# Patient Record
Sex: MALE
[De-identification: names, ages, dates, MRNs, and addresses within clinical notes are randomized per-mention and may not be internally consistent; named-entity substitution may affect disease eponyms.]

---

## 2018-11-05 ENCOUNTER — APPOINTMENT (OUTPATIENT)
Dept: ORTHOPEDIC SURGERY | Facility: CLINIC | Age: 53
End: 2018-11-05
Payer: MEDICAID

## 2018-11-05 VITALS
HEART RATE: 81 BPM | SYSTOLIC BLOOD PRESSURE: 123 MMHG | WEIGHT: 255 LBS | DIASTOLIC BLOOD PRESSURE: 80 MMHG | BODY MASS INDEX: 35.7 KG/M2 | HEIGHT: 71 IN

## 2018-11-05 PROCEDURE — 73564 X-RAY EXAM KNEE 4 OR MORE: CPT | Mod: RT

## 2018-11-05 PROCEDURE — 99204 OFFICE O/P NEW MOD 45 MIN: CPT | Mod: 25

## 2018-11-05 PROCEDURE — 73502 X-RAY EXAM HIP UNI 2-3 VIEWS: CPT | Mod: LT

## 2018-11-05 PROCEDURE — 20610 DRAIN/INJ JOINT/BURSA W/O US: CPT | Mod: RT

## 2019-01-22 ENCOUNTER — APPOINTMENT (OUTPATIENT)
Dept: ORTHOPEDIC SURGERY | Facility: CLINIC | Age: 54
End: 2019-01-22
Payer: MEDICAID

## 2019-01-22 VITALS — WEIGHT: 262 LBS | HEIGHT: 71 IN | BODY MASS INDEX: 36.68 KG/M2

## 2019-01-22 DIAGNOSIS — F17.200 NICOTINE DEPENDENCE, UNSPECIFIED, UNCOMPLICATED: ICD-10-CM

## 2019-01-22 DIAGNOSIS — Z82.61 FAMILY HISTORY OF ARTHRITIS: ICD-10-CM

## 2019-01-22 DIAGNOSIS — Z78.9 OTHER SPECIFIED HEALTH STATUS: ICD-10-CM

## 2019-01-22 DIAGNOSIS — Z80.1 FAMILY HISTORY OF MALIGNANT NEOPLASM OF TRACHEA, BRONCHUS AND LUNG: ICD-10-CM

## 2019-01-22 DIAGNOSIS — F19.90 OTHER PSYCHOACTIVE SUBSTANCE USE, UNSPECIFIED, UNCOMPLICATED: ICD-10-CM

## 2019-01-22 DIAGNOSIS — Z80.52 FAMILY HISTORY OF MALIGNANT NEOPLASM OF BLADDER: ICD-10-CM

## 2019-01-22 PROCEDURE — 99214 OFFICE O/P EST MOD 30 MIN: CPT

## 2019-01-22 NOTE — DISCUSSION/SUMMARY
[de-identified] : This patient has severe left hip osteoarthritis. He has failed a course of conservative management and would like to proceed with a direct anterior approach left total hip arthroplasty. He would like to consider same-day discharge if he clears physical therapy.\par \par The patient is an appropriate candidate for consideration of left total hip replacement. An extensive discussion was conducted of the natural history of the disease and the variety of surgical and non-surgical treatment options available to the patient. A risk/benefit analysis was discussed with the patient reviewing the advantages and disadvantages of surgical intervention at this time. Both the level and length of the patient's pain have made additional conservative treatment measures consisting of physical therapy, and/or corticosteroids contraindicated. A full explanation was given of the nature and the purpose of the procedure and anesthesia, its benefits, possible alternative methods of diagnosis or treatment, the risks involved, the possibility of complications, the foreseeable consequences of the procedure and the possible results of the non-treatment.\par No guarantee or assurance was made as to the results that may be obtained. Specifically, the risks were identified to include, but are not limited to the following: Infection, phlebitis, pulmonary embolism, death, paralysis, dislocation, pain, stiffness, instability, limp, weakness, breakage, leg-length inequality, uncontrolled bleeding, nerve injury, blood vessel injury, pressure sores, anesthetic risks, delayed healing of wound and bone, and wear and loosening. Further discussion was undertaken with the patient about the details of surgical preparation, treatment, and postoperative rehabilitation including medical clearance, autotransfusion, the hospital course, and the postoperative rehabilitation involved. All in all, I feel that this patient is a good candidate for surgical reconstruction.\par \par This patient is a smoker and understands our nicotine cessation policy and will be required to stop smoke 1 month before surgery and continued 1 month minimum after surgery. The patient may  be tested for nicotine in addition prior to undergoing joint arthroplasty.\par

## 2019-01-22 NOTE — HISTORY OF PRESENT ILLNESS
[de-identified] : This is very nice 53-year-old gentleman experiencing left hip and groin and thigh, which is severe in intensity.I have previously diagnosed him with left hip osteoarthritis. His knee pain is significantly improved with the cortisone injection.The hip pain substantially limits activities of daily living. Walking tolerance is reduced.  He has difficulty putting on shoes and socks.  He has difficulty walking up and down stairs.  He does have pain at night.  He works as a  and stands the majority of the day which is making the pain worse.  He does not use a cane or walker.  He has tried physical therapy for this now which made the pain worse.  He has been taking mobic which is not helping to improve the pain. The pain does not travel down his legs and it is not associated with numbness or tingling or weakness.

## 2019-01-22 NOTE — PHYSICAL EXAM
[de-identified] : Patient is well nourished, well-developed, in no acute distress, with appropriate mood and affect. The patient is oriented to time, place, and person. Respirations are even and unlabored. Gait evaluation reveals a limp. There is no inguinal adenopathy. Examination of the contralateral hip shows normal range of motion, strength, no tenderness, and intact skin. The affected limb is well-perfused, shows a grossly normal motor and sensory examination. Examination of the hip shows no skin lesions. Hip motion is reduced and causes pain. Leg lengths are approximately equal. Stinchfield test is positive. Both hips are stable and muscle strength is normal. Pedal pulses are palpable.\par  [de-identified] : AP and lateral x-rays of the left hip, pelvis, and femur were reviewed from prior visit and demonstrate severe degenerative joint disease of the hip with joint space narrowing, osteophyte formation, and subchondral sclerosis.

## 2019-01-28 ENCOUNTER — OUTPATIENT (OUTPATIENT)
Dept: OUTPATIENT SERVICES | Facility: HOSPITAL | Age: 54
LOS: 1 days | End: 2019-01-28
Payer: MEDICAID

## 2019-01-28 VITALS
HEIGHT: 71 IN | OXYGEN SATURATION: 97 % | SYSTOLIC BLOOD PRESSURE: 126 MMHG | RESPIRATION RATE: 16 BRPM | TEMPERATURE: 98 F | HEART RATE: 77 BPM | WEIGHT: 261.91 LBS | DIASTOLIC BLOOD PRESSURE: 88 MMHG

## 2019-01-28 DIAGNOSIS — Z29.9 ENCOUNTER FOR PROPHYLACTIC MEASURES, UNSPECIFIED: ICD-10-CM

## 2019-01-28 DIAGNOSIS — M16.12 UNILATERAL PRIMARY OSTEOARTHRITIS, LEFT HIP: ICD-10-CM

## 2019-01-28 DIAGNOSIS — Z98.890 OTHER SPECIFIED POSTPROCEDURAL STATES: Chronic | ICD-10-CM

## 2019-01-28 DIAGNOSIS — G47.33 OBSTRUCTIVE SLEEP APNEA (ADULT) (PEDIATRIC): ICD-10-CM

## 2019-01-28 DIAGNOSIS — Z01.818 ENCOUNTER FOR OTHER PREPROCEDURAL EXAMINATION: ICD-10-CM

## 2019-01-28 DIAGNOSIS — M16.9 OSTEOARTHRITIS OF HIP, UNSPECIFIED: ICD-10-CM

## 2019-01-28 LAB
ANION GAP SERPL CALC-SCNC: 13 MMOL/L — SIGNIFICANT CHANGE UP (ref 5–17)
BLD GP AB SCN SERPL QL: NEGATIVE — SIGNIFICANT CHANGE UP
BUN SERPL-MCNC: 19 MG/DL — SIGNIFICANT CHANGE UP (ref 7–23)
CALCIUM SERPL-MCNC: 9.1 MG/DL — SIGNIFICANT CHANGE UP (ref 8.4–10.5)
CHLORIDE SERPL-SCNC: 104 MMOL/L — SIGNIFICANT CHANGE UP (ref 96–108)
CO2 SERPL-SCNC: 24 MMOL/L — SIGNIFICANT CHANGE UP (ref 22–31)
CREAT SERPL-MCNC: 0.76 MG/DL — SIGNIFICANT CHANGE UP (ref 0.5–1.3)
GLUCOSE SERPL-MCNC: 88 MG/DL — SIGNIFICANT CHANGE UP (ref 70–99)
HBA1C BLD-MCNC: 5.5 % — SIGNIFICANT CHANGE UP (ref 4–5.6)
HCT VFR BLD CALC: 42.7 % — SIGNIFICANT CHANGE UP (ref 39–50)
HGB BLD-MCNC: 14.1 G/DL — SIGNIFICANT CHANGE UP (ref 13–17)
MCHC RBC-ENTMCNC: 31.5 PG — SIGNIFICANT CHANGE UP (ref 27–34)
MCHC RBC-ENTMCNC: 33 GM/DL — SIGNIFICANT CHANGE UP (ref 32–36)
MCV RBC AUTO: 95.5 FL — SIGNIFICANT CHANGE UP (ref 80–100)
PLATELET # BLD AUTO: 219 K/UL — SIGNIFICANT CHANGE UP (ref 150–400)
POTASSIUM SERPL-MCNC: 4.3 MMOL/L — SIGNIFICANT CHANGE UP (ref 3.5–5.3)
POTASSIUM SERPL-SCNC: 4.3 MMOL/L — SIGNIFICANT CHANGE UP (ref 3.5–5.3)
RBC # BLD: 4.47 M/UL — SIGNIFICANT CHANGE UP (ref 4.2–5.8)
RBC # FLD: 13.3 % — SIGNIFICANT CHANGE UP (ref 10.3–14.5)
RH IG SCN BLD-IMP: NEGATIVE — SIGNIFICANT CHANGE UP
SODIUM SERPL-SCNC: 141 MMOL/L — SIGNIFICANT CHANGE UP (ref 135–145)
WBC # BLD: 6.71 K/UL — SIGNIFICANT CHANGE UP (ref 3.8–10.5)
WBC # FLD AUTO: 6.71 K/UL — SIGNIFICANT CHANGE UP (ref 3.8–10.5)

## 2019-01-28 PROCEDURE — G0463: CPT

## 2019-01-28 PROCEDURE — 80048 BASIC METABOLIC PNL TOTAL CA: CPT

## 2019-01-28 PROCEDURE — 87640 STAPH A DNA AMP PROBE: CPT

## 2019-01-28 PROCEDURE — 83036 HEMOGLOBIN GLYCOSYLATED A1C: CPT

## 2019-01-28 PROCEDURE — 86850 RBC ANTIBODY SCREEN: CPT

## 2019-01-28 PROCEDURE — 85027 COMPLETE CBC AUTOMATED: CPT

## 2019-01-28 PROCEDURE — 86901 BLOOD TYPING SEROLOGIC RH(D): CPT

## 2019-01-28 PROCEDURE — 86900 BLOOD TYPING SEROLOGIC ABO: CPT

## 2019-01-28 PROCEDURE — 87641 MR-STAPH DNA AMP PROBE: CPT

## 2019-01-28 RX ORDER — LIDOCAINE HCL 20 MG/ML
0.2 VIAL (ML) INJECTION ONCE
Qty: 0 | Refills: 0 | Status: DISCONTINUED | OUTPATIENT
Start: 2019-02-08 | End: 2019-02-08

## 2019-01-28 RX ORDER — BUPROPION HYDROCHLORIDE 150 MG/1
1 TABLET, EXTENDED RELEASE ORAL
Qty: 0 | Refills: 0 | COMMUNITY

## 2019-01-28 RX ORDER — ACETAMINOPHEN 500 MG
975 TABLET ORAL ONCE
Qty: 0 | Refills: 0 | Status: DISCONTINUED | OUTPATIENT
Start: 2019-02-08 | End: 2019-02-10

## 2019-01-28 RX ORDER — CEFAZOLIN SODIUM 1 G
2000 VIAL (EA) INJECTION ONCE
Qty: 0 | Refills: 0 | Status: COMPLETED | OUTPATIENT
Start: 2019-02-08 | End: 2019-02-08

## 2019-01-28 RX ORDER — SODIUM CHLORIDE 9 MG/ML
3 INJECTION INTRAMUSCULAR; INTRAVENOUS; SUBCUTANEOUS EVERY 8 HOURS
Qty: 0 | Refills: 0 | Status: DISCONTINUED | OUTPATIENT
Start: 2019-02-08 | End: 2019-02-08

## 2019-01-28 RX ORDER — GABAPENTIN 400 MG/1
300 CAPSULE ORAL ONCE
Qty: 0 | Refills: 0 | Status: COMPLETED | OUTPATIENT
Start: 2019-02-08 | End: 2019-02-08

## 2019-01-28 RX ORDER — TRAMADOL HYDROCHLORIDE 50 MG/1
50 TABLET ORAL ONCE
Qty: 0 | Refills: 0 | Status: DISCONTINUED | OUTPATIENT
Start: 2019-02-08 | End: 2019-02-08

## 2019-01-28 RX ORDER — PANTOPRAZOLE SODIUM 20 MG/1
40 TABLET, DELAYED RELEASE ORAL ONCE
Qty: 0 | Refills: 0 | Status: COMPLETED | OUTPATIENT
Start: 2019-02-08 | End: 2019-02-08

## 2019-01-28 RX ORDER — MELOXICAM 15 MG/1
1 TABLET ORAL
Qty: 0 | Refills: 0 | COMMUNITY

## 2019-01-28 NOTE — H&P PST ADULT - PMH
OA (osteoarthritis) of hip  left  OA (osteoarthritis) of knee  right  Smoker  Quit 1/20/19 - taking wellbutrin

## 2019-01-28 NOTE — H&P PST ADULT - ASSESSMENT
CAPRINI SCORE [CLOT]    AGE RELATED RISK FACTORS                                                       MOBILITY RELATED FACTORS  [ x] Age 41-60 years                                            (1 Point)                  [ ] Bed rest                                                        (1 Point)  [ ] Age: 61-74 years                                           (2 Points)                 [ ] Plaster cast                                                   (2 Points)  [ ] Age= 75 years                                              (3 Points)                 [ ] Bed bound for more than 72 hours                 (2 Points)    DISEASE RELATED RISK FACTORS                                               GENDER SPECIFIC FACTORS  [ ] Edema in the lower extremities                       (1 Point)                  [ ] Pregnancy                                                     (1 Point)  [ ] Varicose veins                                               (1 Point)                  [ ] Post-partum < 6 weeks                                   (1 Point)             [x ] BMI > 25 Kg/m2                                            (1 Point)                  [ ] Hormonal therapy  or oral contraception          (1 Point)                 [ ] Sepsis (in the previous month)                        (1 Point)                  [ ] History of pregnancy complications                 (1 point)  [ ] Pneumonia or serious lung disease                                               [ ] Unexplained or recurrent                     (1 Point)           (in the previous month)                               (1 Point)  [ ] Abnormal pulmonary function test                     (1 Point)                 SURGERY RELATED RISK FACTORS  [ ] Acute myocardial infarction                              (1 Point)                 [ ]  Section                                             (1 Point)  [ ] Congestive heart failure (in the previous month)  (1 Point)               [ ] Minor surgery                                                  (1 Point)   [ ] Inflammatory bowel disease                             (1 Point)                 [ ] Arthroscopic surgery                                        (2 Points)  [ ] Central venous access                                      (2 Points)                [ ] General surgery lasting more than 45 minutes   (2 Points)       [ ] Stroke (in the previous month)                          (5 Points)               [ x] Elective arthroplasty                                         (5 Points)                                                                                                                                               HEMATOLOGY RELATED FACTORS                                                 TRAUMA RELATED RISK FACTORS  [ ] Prior episodes of VTE                                     (3 Points)                 [ ] Fracture of the hip, pelvis, or leg                       (5 Points)  [ ] Positive family history for VTE                         (3 Points)                 [ ] Acute spinal cord injury (in the previous month)  (5 Points)  [ ] Prothrombin 11868 A                                     (3 Points)                 [ ] Paralysis  (less than 1 month)                             (5 Points)  [ ] Factor V Leiden                                             (3 Points)                  [ ] Multiple Trauma within 1 month                        (5 Points)  [ ] Lupus anticoagulants                                     (3 Points)                                                           [ ] Anticardiolipin antibodies                               (3 Points)                                                       [ ] High homocysteine in the blood                      (3 Points)                                             [ ] Other congenital or acquired thrombophilia      (3 Points)                                                [ ] Heparin induced thrombocytopenia                  (3 Points)                                          Total Score [       7   ]

## 2019-01-28 NOTE — H&P PST ADULT - NSANTHOSAYNRD_GEN_A_CORE
No. ODALYS screening performed.  STOP BANG Legend: 0-2 = LOW Risk; 3-4 = INTERMEDIATE Risk; 5-8 = HIGH Risk

## 2019-01-28 NOTE — H&P PST ADULT - PROBLEM SELECTOR PLAN 1
Left Anterior Total Hip Replacement  Pre-op education, including Chlorhexidine soap, provided - all questions answered

## 2019-01-28 NOTE — H&P PST ADULT - HISTORY OF PRESENT ILLNESS
52 yo male, PMH OA of left hip and right knee for over 2 years, has been managed with NSAIDs but in the past 3 months pain on left  hip has become worse without any relief. Pt. presents to PST today for scheduled Left Anterior Total Hip Replacement on 2/8/19. Denies recent fever, chills, chest pain, SOB, changes in bowel/urinary habits.

## 2019-01-29 LAB
MRSA PCR RESULT.: SIGNIFICANT CHANGE UP
S AUREUS DNA NOSE QL NAA+PROBE: DETECTED

## 2019-01-31 ENCOUNTER — OTHER (OUTPATIENT)
Age: 54
End: 2019-01-31

## 2019-02-06 PROBLEM — M25.552 LEFT HIP PAIN: Status: RESOLVED | Noted: 2018-11-04 | Resolved: 2019-02-06

## 2019-02-06 PROBLEM — M16.12 ARTHRITIS OF LEFT HIP: Status: RESOLVED | Noted: 2018-11-05 | Resolved: 2019-02-06

## 2019-02-07 ENCOUNTER — TRANSCRIPTION ENCOUNTER (OUTPATIENT)
Age: 54
End: 2019-02-07

## 2019-02-08 ENCOUNTER — APPOINTMENT (OUTPATIENT)
Dept: ORTHOPEDIC SURGERY | Facility: HOSPITAL | Age: 54
End: 2019-02-08

## 2019-02-08 ENCOUNTER — INPATIENT (INPATIENT)
Facility: HOSPITAL | Age: 54
LOS: 1 days | Discharge: HOME | DRG: 470 | End: 2019-02-10
Attending: ORTHOPAEDIC SURGERY | Admitting: ORTHOPAEDIC SURGERY
Payer: MEDICAID

## 2019-02-08 VITALS
TEMPERATURE: 98 F | HEIGHT: 71 IN | DIASTOLIC BLOOD PRESSURE: 76 MMHG | HEART RATE: 76 BPM | WEIGHT: 261.91 LBS | RESPIRATION RATE: 18 BRPM | SYSTOLIC BLOOD PRESSURE: 116 MMHG | OXYGEN SATURATION: 95 %

## 2019-02-08 DIAGNOSIS — M16.12 UNILATERAL PRIMARY OSTEOARTHRITIS, LEFT HIP: ICD-10-CM

## 2019-02-08 DIAGNOSIS — M25.552 PAIN IN LEFT HIP: ICD-10-CM

## 2019-02-08 DIAGNOSIS — Z98.890 OTHER SPECIFIED POSTPROCEDURAL STATES: Chronic | ICD-10-CM

## 2019-02-08 LAB — RH IG SCN BLD-IMP: NEGATIVE — SIGNIFICANT CHANGE UP

## 2019-02-08 PROCEDURE — 72170 X-RAY EXAM OF PELVIS: CPT | Mod: 26

## 2019-02-08 PROCEDURE — 27130 TOTAL HIP ARTHROPLASTY: CPT | Mod: LT

## 2019-02-08 RX ORDER — ONDANSETRON 8 MG/1
4 TABLET, FILM COATED ORAL EVERY 6 HOURS
Qty: 0 | Refills: 0 | Status: DISCONTINUED | OUTPATIENT
Start: 2019-02-08 | End: 2019-02-10

## 2019-02-08 RX ORDER — ASPIRIN/CALCIUM CARB/MAGNESIUM 324 MG
81 TABLET ORAL EVERY 12 HOURS
Qty: 0 | Refills: 0 | Status: DISCONTINUED | OUTPATIENT
Start: 2019-02-08 | End: 2019-02-08

## 2019-02-08 RX ORDER — ACETAMINOPHEN 500 MG
1000 TABLET ORAL ONCE
Qty: 0 | Refills: 0 | Status: COMPLETED | OUTPATIENT
Start: 2019-02-09 | End: 2019-02-09

## 2019-02-08 RX ORDER — ACETAMINOPHEN 500 MG
1000 TABLET ORAL ONCE
Qty: 0 | Refills: 0 | Status: COMPLETED | OUTPATIENT
Start: 2019-02-08 | End: 2019-02-08

## 2019-02-08 RX ORDER — INFLUENZA VIRUS VACCINE 15; 15; 15; 15 UG/.5ML; UG/.5ML; UG/.5ML; UG/.5ML
0.5 SUSPENSION INTRAMUSCULAR ONCE
Qty: 0 | Refills: 0 | Status: DISCONTINUED | OUTPATIENT
Start: 2019-02-08 | End: 2019-02-10

## 2019-02-08 RX ORDER — SODIUM CHLORIDE 9 MG/ML
1000 INJECTION INTRAMUSCULAR; INTRAVENOUS; SUBCUTANEOUS
Qty: 0 | Refills: 0 | Status: DISCONTINUED | OUTPATIENT
Start: 2019-02-08 | End: 2019-02-10

## 2019-02-08 RX ORDER — HYDROMORPHONE HYDROCHLORIDE 2 MG/ML
0.5 INJECTION INTRAMUSCULAR; INTRAVENOUS; SUBCUTANEOUS
Qty: 0 | Refills: 0 | Status: DISCONTINUED | OUTPATIENT
Start: 2019-02-08 | End: 2019-02-08

## 2019-02-08 RX ORDER — PANTOPRAZOLE SODIUM 20 MG/1
40 TABLET, DELAYED RELEASE ORAL
Qty: 0 | Refills: 0 | Status: DISCONTINUED | OUTPATIENT
Start: 2019-02-08 | End: 2019-02-10

## 2019-02-08 RX ORDER — POLYETHYLENE GLYCOL 3350 17 G/17G
17 POWDER, FOR SOLUTION ORAL DAILY
Qty: 0 | Refills: 0 | Status: DISCONTINUED | OUTPATIENT
Start: 2019-02-08 | End: 2019-02-10

## 2019-02-08 RX ORDER — ASPIRIN/CALCIUM CARB/MAGNESIUM 324 MG
325 TABLET ORAL
Qty: 0 | Refills: 0 | Status: DISCONTINUED | OUTPATIENT
Start: 2019-02-08 | End: 2019-02-08

## 2019-02-08 RX ORDER — MAGNESIUM HYDROXIDE 400 MG/1
30 TABLET, CHEWABLE ORAL DAILY
Qty: 0 | Refills: 0 | Status: DISCONTINUED | OUTPATIENT
Start: 2019-02-08 | End: 2019-02-10

## 2019-02-08 RX ORDER — SENNA PLUS 8.6 MG/1
2 TABLET ORAL AT BEDTIME
Qty: 0 | Refills: 0 | Status: DISCONTINUED | OUTPATIENT
Start: 2019-02-08 | End: 2019-02-10

## 2019-02-08 RX ORDER — DOCUSATE SODIUM 100 MG
100 CAPSULE ORAL THREE TIMES A DAY
Qty: 0 | Refills: 0 | Status: DISCONTINUED | OUTPATIENT
Start: 2019-02-08 | End: 2019-02-10

## 2019-02-08 RX ORDER — KETOROLAC TROMETHAMINE 30 MG/ML
30 SYRINGE (ML) INJECTION EVERY 8 HOURS
Qty: 0 | Refills: 0 | Status: DISCONTINUED | OUTPATIENT
Start: 2019-02-08 | End: 2019-02-10

## 2019-02-08 RX ORDER — OXYCODONE HYDROCHLORIDE 5 MG/1
10 TABLET ORAL EVERY 4 HOURS
Qty: 0 | Refills: 0 | Status: DISCONTINUED | OUTPATIENT
Start: 2019-02-08 | End: 2019-02-10

## 2019-02-08 RX ORDER — CELECOXIB 200 MG/1
200 CAPSULE ORAL EVERY 12 HOURS
Qty: 0 | Refills: 0 | Status: DISCONTINUED | OUTPATIENT
Start: 2019-02-10 | End: 2019-02-10

## 2019-02-08 RX ORDER — TRAMADOL HYDROCHLORIDE 50 MG/1
50 TABLET ORAL EVERY 8 HOURS
Qty: 0 | Refills: 0 | Status: DISCONTINUED | OUTPATIENT
Start: 2019-02-08 | End: 2019-02-10

## 2019-02-08 RX ORDER — OXYCODONE HYDROCHLORIDE 5 MG/1
5 TABLET ORAL EVERY 4 HOURS
Qty: 0 | Refills: 0 | Status: DISCONTINUED | OUTPATIENT
Start: 2019-02-08 | End: 2019-02-10

## 2019-02-08 RX ORDER — ASCORBIC ACID 60 MG
500 TABLET,CHEWABLE ORAL
Qty: 0 | Refills: 0 | Status: DISCONTINUED | OUTPATIENT
Start: 2019-02-08 | End: 2019-02-10

## 2019-02-08 RX ORDER — ACETAMINOPHEN 500 MG
975 TABLET ORAL EVERY 8 HOURS
Qty: 0 | Refills: 0 | Status: DISCONTINUED | OUTPATIENT
Start: 2019-02-09 | End: 2019-02-10

## 2019-02-08 RX ORDER — FOLIC ACID 0.8 MG
1 TABLET ORAL DAILY
Qty: 0 | Refills: 0 | Status: DISCONTINUED | OUTPATIENT
Start: 2019-02-08 | End: 2019-02-10

## 2019-02-08 RX ORDER — DIAZEPAM 5 MG
2 TABLET ORAL EVERY 8 HOURS
Qty: 0 | Refills: 0 | Status: DISCONTINUED | OUTPATIENT
Start: 2019-02-08 | End: 2019-02-10

## 2019-02-08 RX ORDER — SODIUM CHLORIDE 9 MG/ML
500 INJECTION INTRAMUSCULAR; INTRAVENOUS; SUBCUTANEOUS ONCE
Qty: 0 | Refills: 0 | Status: COMPLETED | OUTPATIENT
Start: 2019-02-08 | End: 2019-02-08

## 2019-02-08 RX ORDER — SODIUM CHLORIDE 9 MG/ML
500 INJECTION INTRAMUSCULAR; INTRAVENOUS; SUBCUTANEOUS ONCE
Qty: 0 | Refills: 0 | Status: COMPLETED | OUTPATIENT
Start: 2019-02-09 | End: 2019-02-09

## 2019-02-08 RX ORDER — ONDANSETRON 8 MG/1
4 TABLET, FILM COATED ORAL ONCE
Qty: 0 | Refills: 0 | Status: DISCONTINUED | OUTPATIENT
Start: 2019-02-08 | End: 2019-02-08

## 2019-02-08 RX ORDER — ASPIRIN/CALCIUM CARB/MAGNESIUM 324 MG
325 TABLET ORAL
Qty: 0 | Refills: 0 | Status: DISCONTINUED | OUTPATIENT
Start: 2019-02-08 | End: 2019-02-10

## 2019-02-08 RX ORDER — CEFAZOLIN SODIUM 1 G
3000 VIAL (EA) INJECTION EVERY 8 HOURS
Qty: 0 | Refills: 0 | Status: COMPLETED | OUTPATIENT
Start: 2019-02-08 | End: 2019-02-09

## 2019-02-08 RX ADMIN — Medication 30 MILLIGRAM(S): at 21:56

## 2019-02-08 RX ADMIN — SODIUM CHLORIDE 1000 MILLILITER(S): 9 INJECTION INTRAMUSCULAR; INTRAVENOUS; SUBCUTANEOUS at 12:16

## 2019-02-08 RX ADMIN — Medication 200 MILLIGRAM(S): at 16:20

## 2019-02-08 RX ADMIN — HYDROMORPHONE HYDROCHLORIDE 0.5 MILLIGRAM(S): 2 INJECTION INTRAMUSCULAR; INTRAVENOUS; SUBCUTANEOUS at 12:58

## 2019-02-08 RX ADMIN — SODIUM CHLORIDE 1000 MILLILITER(S): 9 INJECTION INTRAMUSCULAR; INTRAVENOUS; SUBCUTANEOUS at 17:56

## 2019-02-08 RX ADMIN — Medication 30 MILLIGRAM(S): at 22:26

## 2019-02-08 RX ADMIN — HYDROMORPHONE HYDROCHLORIDE 0.5 MILLIGRAM(S): 2 INJECTION INTRAMUSCULAR; INTRAVENOUS; SUBCUTANEOUS at 13:00

## 2019-02-08 RX ADMIN — GABAPENTIN 300 MILLIGRAM(S): 400 CAPSULE ORAL at 06:13

## 2019-02-08 RX ADMIN — OXYCODONE HYDROCHLORIDE 10 MILLIGRAM(S): 5 TABLET ORAL at 21:56

## 2019-02-08 RX ADMIN — PANTOPRAZOLE SODIUM 40 MILLIGRAM(S): 20 TABLET, DELAYED RELEASE ORAL at 06:13

## 2019-02-08 RX ADMIN — Medication 325 MILLIGRAM(S): at 21:56

## 2019-02-08 RX ADMIN — OXYCODONE HYDROCHLORIDE 10 MILLIGRAM(S): 5 TABLET ORAL at 17:56

## 2019-02-08 RX ADMIN — Medication 500 MILLIGRAM(S): at 17:56

## 2019-02-08 RX ADMIN — HYDROMORPHONE HYDROCHLORIDE 0.5 MILLIGRAM(S): 2 INJECTION INTRAMUSCULAR; INTRAVENOUS; SUBCUTANEOUS at 12:30

## 2019-02-08 RX ADMIN — Medication 100 MILLIGRAM(S): at 21:58

## 2019-02-08 RX ADMIN — OXYCODONE HYDROCHLORIDE 10 MILLIGRAM(S): 5 TABLET ORAL at 18:30

## 2019-02-08 RX ADMIN — HYDROMORPHONE HYDROCHLORIDE 0.5 MILLIGRAM(S): 2 INJECTION INTRAMUSCULAR; INTRAVENOUS; SUBCUTANEOUS at 12:45

## 2019-02-08 RX ADMIN — HYDROMORPHONE HYDROCHLORIDE 0.5 MILLIGRAM(S): 2 INJECTION INTRAMUSCULAR; INTRAVENOUS; SUBCUTANEOUS at 13:10

## 2019-02-08 RX ADMIN — OXYCODONE HYDROCHLORIDE 10 MILLIGRAM(S): 5 TABLET ORAL at 22:26

## 2019-02-08 RX ADMIN — HYDROMORPHONE HYDROCHLORIDE 0.5 MILLIGRAM(S): 2 INJECTION INTRAMUSCULAR; INTRAVENOUS; SUBCUTANEOUS at 12:15

## 2019-02-08 RX ADMIN — Medication 30 MILLIGRAM(S): at 17:14

## 2019-02-08 RX ADMIN — TRAMADOL HYDROCHLORIDE 50 MILLIGRAM(S): 50 TABLET ORAL at 07:50

## 2019-02-08 RX ADMIN — Medication 100 MILLIGRAM(S): at 15:06

## 2019-02-08 RX ADMIN — Medication 400 MILLIGRAM(S): at 17:08

## 2019-02-08 RX ADMIN — Medication 30 MILLIGRAM(S): at 14:07

## 2019-02-08 NOTE — BRIEF OPERATIVE NOTE - PROCEDURE
<<-----Click on this checkbox to enter Procedure Arthroplasty, hip, total, anterior approach  02/08/2019  left  Active  JOANAOGIANN

## 2019-02-08 NOTE — CHART NOTE - NSCHARTNOTEFT_GEN_A_CORE
Resting without complaints.  No Chest Pain, SOB, N/V.    T(C): 36.6 (02-08-19 @ 14:30), Max: 36.8 (02-08-19 @ 05:55)  HR: 88 (02-08-19 @ 14:30) (63 - 89)  BP: 102/63 (02-08-19 @ 14:30) (89/57 - 116/79)  RR: 18 (02-08-19 @ 14:30) (14 - 20)  SpO2: 100% (02-08-19 @ 14:30) (92% - 100%)  Wt(kg): --    Exam:  Alert and [place], No Acute Distress  Card: +S1/S2, RRR  Pulm: CTAB  Abdomen soft / benign  Read  [x ]   EXT   LLE        Aquacel dressing C/D [ ]        Calves soft       (+) DF  PF;  EHL /FHL 5/5        No Sensory Deficits noted        2+ pulses    Xray:---- prosthesis in good aligment      A/P: S/p Arthroplasty, hip, total, anterior approach      -PT/QN-VNPF-sgkashss precaution  -Chk AM Labs  -DVT PPx baby ASA (81mg BID  -Pain Control PO/IV Pain Rx  -Continue Current Tx  -Dispo planning:  anticipate home      ***See Above  [facility]  Orthopedics  B: [id number]/ [id number]  S: [id number] Resting without complaints.  No Chest Pain, SOB, N/V.    T(C): 36.6 (02-08-19 @ 14:30), Max: 36.8 (02-08-19 @ 05:55)  HR: 88 (02-08-19 @ 14:30) (63 - 89)  BP: 102/63 (02-08-19 @ 14:30) (89/57 - 116/79)  RR: 18 (02-08-19 @ 14:30) (14 - 20)  SpO2: 100% (02-08-19 @ 14:30) (92% - 100%)  Wt(kg): --    Exam:  Alert and [place], No Acute Distress  Card: +S1/S2, RRR  Pulm: CTAB  Abdomen soft / benign  Read  [x ]   EXT   LLE        Aquacel dressing C/D [ ]        Calves soft       (+) DF  PF;  EHL /FHL 5/5        No Sensory Deficits noted        2+ pulses    Xray:---- prosthesis in good aligment      A/P: S/p Arthroplasty, hip, total, anterior approach      -PT/VQ-SZJF-xrkbtwuk precaution  -Chk AM Labs  -DVT PPx baby ASA (81mg BID  -Pain Control PO/IV Pain Rx  -Continue Current Tx  -Dispo planning:  anticipate home      ***See Above  [facility]  Orthopedics  B: [id number]/ [id number]  S: [id number]    I agree with the above note and have personally seen and examined this patient. All pertinent films have been reviewed. Please refer to clinical documentation of the history, physical examinations, data summary, and both assessment and plan as documented above and with which I agree.    Jose L Leyva MD  Attending Orthopedic Surgeon

## 2019-02-08 NOTE — PHYSICAL THERAPY INITIAL EVALUATION ADULT - ADDITIONAL COMMENTS
Pt lives alone in a private house with one flight of steps inside. Pt reports that upon discharge, pt will stay at his mom's house with is a [place] (6+6 with HR). Pt works as a  and hairdresser.

## 2019-02-08 NOTE — PHYSICAL THERAPY INITIAL EVALUATION ADULT - STRENGTHENING, PT EVAL
GOAL: Pt will improve LLE strength to 4/5, for increased limb stability, to improve gait and facilitate stair negotiation in 2 weeks.

## 2019-02-08 NOTE — PHYSICAL THERAPY INITIAL EVALUATION ADULT - PLANNED THERAPY INTERVENTIONS, PT EVAL
gait training/transfer training/stair negotiation: GOAL: Pt will be able to negotiate 10 steps +HR independently with reciprocal pattern in 2 weeks./strengthening

## 2019-02-08 NOTE — PHYSICAL THERAPY INITIAL EVALUATION ADULT - PERTINENT HX OF CURRENT PROBLEM, REHAB EVAL
Pt is a 52 y/o male admitted to [facility] on 2/8/19 PMH OA of left hip and right knee for over 2 years, has been managed with NSAIDs but in the past 3 months pain on left  hip has become worse without any relief. Pt is now s/p L ALBERTO

## 2019-02-09 ENCOUNTER — TRANSCRIPTION ENCOUNTER (OUTPATIENT)
Age: 54
End: 2019-02-09

## 2019-02-09 LAB
ANION GAP SERPL CALC-SCNC: 8 MMOL/L — SIGNIFICANT CHANGE UP (ref 5–17)
BUN SERPL-MCNC: 11 MG/DL — SIGNIFICANT CHANGE UP (ref 7–23)
CALCIUM SERPL-MCNC: 7.8 MG/DL — LOW (ref 8.4–10.5)
CHLORIDE SERPL-SCNC: 108 MMOL/L — SIGNIFICANT CHANGE UP (ref 96–108)
CO2 SERPL-SCNC: 24 MMOL/L — SIGNIFICANT CHANGE UP (ref 22–31)
CREAT SERPL-MCNC: 0.64 MG/DL — SIGNIFICANT CHANGE UP (ref 0.5–1.3)
GLUCOSE SERPL-MCNC: 106 MG/DL — HIGH (ref 70–99)
HCT VFR BLD CALC: 30.8 % — LOW (ref 39–50)
HGB BLD-MCNC: 9.9 G/DL — LOW (ref 13–17)
MCHC RBC-ENTMCNC: 31.8 PG — SIGNIFICANT CHANGE UP (ref 27–34)
MCHC RBC-ENTMCNC: 32.1 GM/DL — SIGNIFICANT CHANGE UP (ref 32–36)
MCV RBC AUTO: 99 FL — SIGNIFICANT CHANGE UP (ref 80–100)
PLATELET # BLD AUTO: 183 K/UL — SIGNIFICANT CHANGE UP (ref 150–400)
POTASSIUM SERPL-MCNC: 3.8 MMOL/L — SIGNIFICANT CHANGE UP (ref 3.5–5.3)
POTASSIUM SERPL-SCNC: 3.8 MMOL/L — SIGNIFICANT CHANGE UP (ref 3.5–5.3)
RBC # BLD: 3.11 M/UL — LOW (ref 4.2–5.8)
RBC # FLD: 13 % — SIGNIFICANT CHANGE UP (ref 10.3–14.5)
SODIUM SERPL-SCNC: 140 MMOL/L — SIGNIFICANT CHANGE UP (ref 135–145)
WBC # BLD: 9.61 K/UL — SIGNIFICANT CHANGE UP (ref 3.8–10.5)
WBC # FLD AUTO: 9.61 K/UL — SIGNIFICANT CHANGE UP (ref 3.8–10.5)

## 2019-02-09 RX ADMIN — Medication 30 MILLIGRAM(S): at 06:10

## 2019-02-09 RX ADMIN — Medication 500 MILLIGRAM(S): at 17:53

## 2019-02-09 RX ADMIN — Medication 1000 MILLIGRAM(S): at 03:30

## 2019-02-09 RX ADMIN — Medication 975 MILLIGRAM(S): at 09:40

## 2019-02-09 RX ADMIN — PANTOPRAZOLE SODIUM 40 MILLIGRAM(S): 20 TABLET, DELAYED RELEASE ORAL at 06:10

## 2019-02-09 RX ADMIN — SODIUM CHLORIDE 1000 MILLILITER(S): 9 INJECTION INTRAMUSCULAR; INTRAVENOUS; SUBCUTANEOUS at 06:11

## 2019-02-09 RX ADMIN — Medication 975 MILLIGRAM(S): at 18:20

## 2019-02-09 RX ADMIN — OXYCODONE HYDROCHLORIDE 10 MILLIGRAM(S): 5 TABLET ORAL at 09:07

## 2019-02-09 RX ADMIN — OXYCODONE HYDROCHLORIDE 10 MILLIGRAM(S): 5 TABLET ORAL at 13:31

## 2019-02-09 RX ADMIN — OXYCODONE HYDROCHLORIDE 10 MILLIGRAM(S): 5 TABLET ORAL at 18:20

## 2019-02-09 RX ADMIN — OXYCODONE HYDROCHLORIDE 10 MILLIGRAM(S): 5 TABLET ORAL at 17:52

## 2019-02-09 RX ADMIN — Medication 1 MILLIGRAM(S): at 13:32

## 2019-02-09 RX ADMIN — Medication 30 MILLIGRAM(S): at 06:40

## 2019-02-09 RX ADMIN — Medication 100 MILLIGRAM(S): at 06:09

## 2019-02-09 RX ADMIN — OXYCODONE HYDROCHLORIDE 10 MILLIGRAM(S): 5 TABLET ORAL at 03:31

## 2019-02-09 RX ADMIN — Medication 500 MILLIGRAM(S): at 06:09

## 2019-02-09 RX ADMIN — OXYCODONE HYDROCHLORIDE 10 MILLIGRAM(S): 5 TABLET ORAL at 14:00

## 2019-02-09 RX ADMIN — OXYCODONE HYDROCHLORIDE 10 MILLIGRAM(S): 5 TABLET ORAL at 03:01

## 2019-02-09 RX ADMIN — Medication 30 MILLIGRAM(S): at 13:31

## 2019-02-09 RX ADMIN — Medication 30 MILLIGRAM(S): at 14:45

## 2019-02-09 RX ADMIN — Medication 325 MILLIGRAM(S): at 06:09

## 2019-02-09 RX ADMIN — OXYCODONE HYDROCHLORIDE 10 MILLIGRAM(S): 5 TABLET ORAL at 09:40

## 2019-02-09 RX ADMIN — Medication 30 MILLIGRAM(S): at 21:45

## 2019-02-09 RX ADMIN — Medication 400 MILLIGRAM(S): at 03:03

## 2019-02-09 RX ADMIN — Medication 975 MILLIGRAM(S): at 09:08

## 2019-02-09 RX ADMIN — Medication 100 MILLIGRAM(S): at 13:31

## 2019-02-09 RX ADMIN — Medication 975 MILLIGRAM(S): at 17:53

## 2019-02-09 RX ADMIN — Medication 30 MILLIGRAM(S): at 21:14

## 2019-02-09 RX ADMIN — SENNA PLUS 2 TABLET(S): 8.6 TABLET ORAL at 21:14

## 2019-02-09 RX ADMIN — Medication 100 MILLIGRAM(S): at 21:14

## 2019-02-09 RX ADMIN — Medication 325 MILLIGRAM(S): at 17:54

## 2019-02-09 RX ADMIN — POLYETHYLENE GLYCOL 3350 17 GRAM(S): 17 POWDER, FOR SOLUTION ORAL at 13:31

## 2019-02-09 RX ADMIN — Medication 1 TABLET(S): at 13:31

## 2019-02-09 RX ADMIN — Medication 200 MILLIGRAM(S): at 00:42

## 2019-02-09 NOTE — OCCUPATIONAL THERAPY INITIAL EVALUATION ADULT - PERTINENT HX OF CURRENT PROBLEM, REHAB EVAL
54 yo male, PMH OA of left hip and right knee for over 2 years, has been managed with NSAIDs but in the past 3 months pain on left  hip has become worse without any relief. Pt. presents to PST today for scheduled Left Anterior Total Hip Replacement on 2/8/19. Denies recent fever, chills, chest pain, SOB, changes in bowel/urinary habits.

## 2019-02-09 NOTE — OCCUPATIONAL THERAPY INITIAL EVALUATION ADULT - ADDITIONAL COMMENTS
xray pelvis 2/8- Status post total left hip arthroplasty which ends acetabular fixation screws. Alignment appears intact. Mild right hip joint space narrowing. Postoperative air and edema overlie the proximal left thigh. Peripherally calcified testicular prosthesis is visualized.

## 2019-02-09 NOTE — PROGRESS NOTE ADULT - SUBJECTIVE AND OBJECTIVE BOX
Patient is a 53y old  Male who presents with a chief complaint of  left hip pain  Patient s/p Left total hip arthoplasty (anterior approach)   POST OPERATIVE DAY #:  [1 ]   Patient comfortable  No complaints    T(C): 36.8 (02-09-19 @ 04:48), Max: 36.9 (02-08-19 @ 17:48)  HR: 86 (02-09-19 @ 04:48) (57 - 89)  BP: 106/67 (02-09-19 @ 04:48) (89/57 - 120/74)  RR: 18 (02-09-19 @ 04:48) (12 - 20)  SpO2: 97% (02-09-19 @ 04:48) (92% - 100%)    PHYSICAL EXAM:  NAD, Alert  [Left ] Hip:Aquacel dressing C/D/I; sensation grossly intact to light touch; (+) DF/PF; (+) Distal Pulses; No Calf tenderness B/L, PAS

## 2019-02-09 NOTE — DISCHARGE NOTE ADULT - MEDICATION SUMMARY - MEDICATIONS TO TAKE
I will START or STAY ON the medications listed below when I get home from the hospital:    Rolling Walker  -- Dx: Left hip arthritis  LIA: 99M  -- Indication: For ambulation aid    aspirin 325 mg oral tablet  -- 1 tab(s) by mouth 2 times a day for 4 weeks post op MDD:2  -- Indication: For antiplatelet therapy, stay on this medication for 4 weeks post op    acetaminophen 325 mg oral tablet  -- 3 tab(s) by mouth every 8 hours as needed for mild pain, fever, H/A  -- Indication: For fever, H/A, mild pain    oxyCODONE 5 mg oral tablet  -- 1 tab(s) by mouth every 4 hours, As needed, Moderate Pain (4 - 6) MDD:6  -- Indication: For moderate to severe pain    traMADol 50 mg oral tablet  -- 1 tab(s) by mouth every 8 hours, As needed, Mild Pain (1 - 3) MDD:3  -- Indication: For mild to moderate pain    Mobic 15 mg oral tablet  -- 1 tab(s) by mouth once a day (in the evening)  -- Indication: For NSAID    omeprazole 40 mg oral delayed release capsule  -- 1 cap(s) by mouth once a day MDD:1  -- It is very important that you take or use this exactly as directed.  Do not skip doses or discontinue unless directed by your doctor.  Obtain medical advice before taking any non-prescription drugs as some may affect the action of this medication.  Swallow whole.  Do not crush.    -- Indication: For gastrointestinal  prophylaxis    Wellbutrin 100 mg oral tablet  -- 1 tab(s) by mouth once a day at 1 pm  -- Indication: For central nervous system agent

## 2019-02-09 NOTE — PROGRESS NOTE ADULT - ASSESSMENT
52 y/o M s/p left total hip arthroplasty (AA) POD# 1, physical therapy, incentive spirometer  More Montes PA-C  Orthopaedic Surgery  Team pager [phone]  [facility] [phone]  [phone]-[phone]

## 2019-02-09 NOTE — DISCHARGE NOTE ADULT - CARE PLAN
Principal Discharge DX:	OA (osteoarthritis) of hip  Goal:	surgical intervention should result in improved function  Assessment and plan of treatment:	continue weight bearing as tolerated ambulation with rolling walker, and maintaining posterior total hip precautions Principal Discharge DX:	OA (osteoarthritis) of hip  Goal:	surgical intervention should result in improved function  Assessment and plan of treatment:	continue weight bearing as tolerated ambulation with rolling walker, and maintaining anterior total hip precautions

## 2019-02-09 NOTE — DISCHARGE NOTE ADULT - PATIENT PORTAL LINK FT
You can access the [facility] Patient Portal, offered by [facility], by registering with the following website: [email or web address]

## 2019-02-09 NOTE — DISCHARGE NOTE ADULT - FINDINGS/TREATMENT
Continue weight bearing as tolerated ambulation with rolling walker and maintaining anterior total hip precautions

## 2019-02-09 NOTE — DISCHARGE NOTE ADULT - NS AS ACTIVITY OBS
Do not make important decisions/Walking-Indoors allowed/continue weight bearing as tolerated ambulation with rolling walker, and maintaining posterior total hip precautions/Do not drive or operate machinery/No Heavy lifting/straining/Walking-Outdoors allowed/Stairs allowed No Heavy lifting/straining/Walking-Indoors allowed/continue weight bearing as tolerated ambulation with rolling walker, and maintaining anterior total hip precautions/Walking-Outdoors allowed/Stairs allowed/Do not make important decisions/Do not drive or operate machinery

## 2019-02-09 NOTE — DISCHARGE NOTE ADULT - PLAN OF CARE
surgical intervention should result in improved function continue weight bearing as tolerated ambulation with rolling walker, and maintaining posterior total hip precautions continue weight bearing as tolerated ambulation with rolling walker, and maintaining anterior total hip precautions

## 2019-02-09 NOTE — OCCUPATIONAL THERAPY INITIAL EVALUATION ADULT - LIVES WITH, PROFILE
alone/Pt lives in an apt in a house alone. Staying with mom after D/C in a pvt home with 10 steps to enter with a handrail. 5 steps inside to main level. +Tub with curtain and grab bar. No AD

## 2019-02-09 NOTE — DISCHARGE NOTE ADULT - CARE PROVIDER_API CALL
Jose L Leyva)  Orthopedics  [address], [address]  [place] [address]  Phone: [phone]  Fax: [phone]  Follow Up Time:

## 2019-02-09 NOTE — PROGRESS NOTE ADULT - ATTENDING COMMENTS
I agree with the above note and have personally seen and examined this patient. All pertinent films have been reviewed. Please refer to clinical documentation of the history, physical examinations, data summary, and both assessment and plan as documented above and with which I agree.    Jose L Leyva MD  Attending Orthopedic Surgeon

## 2019-02-09 NOTE — DISCHARGE NOTE ADULT - ADDITIONAL INSTRUCTIONS
Keep surgical incision/Aquacel dressing clean and dry. Continue weight bearing as tolerated ambulation with rolling walker, and maintaining posterior total hip precautions. Follow up with  Keep surgical incision/Aquacel dressing clean and dry. Continue weight bearing as tolerated ambulation with rolling walker, and maintaining anterior total hip precautions. Follow up with Dr Leyva 1 month post op.  Remove dressing as directed. follow up with your primary care provider in 1-2 weeks.

## 2019-02-09 NOTE — DISCHARGE NOTE ADULT - HOSPITAL COURSE
Reason for Admission	"left hip replacement"	     History of Present Illness:  History of Present Illness		  52 yo male, PMH OA of left hip and right knee for over 2 years, has been managed with NSAIDs but in the past 3 months pain on left  hip has become worse without any relief. Pt. presents to PST today for scheduled Left Anterior Total Hip Replacement on 2/8/19. Denies recent fever, chills, chest pain, SOB, changes in bowel/urinary habits.    Allergies/Medications:   Allergies:        Allergies:  	No Known Allergies:     Home Medications:   * Patient Currently Takes Medications as of 28-Jan-2019 19:07 documented in Structured Notes  · 	Mobic 15 mg oral tablet: Last Dose Taken:  , 1 tab(s) orally once a day (in the evening)  · 	Wellbutrin 100 mg oral tablet: Last Dose Taken:  , 1 tab(s) orally once a day at 1 pm    PMH/PSH/FH/SH:    Past Medical History:  OA (osteoarthritis) of hip  left  OA (osteoarthritis) of knee  right  Smoker  Quit 1/20/19 - taking wellbutrin.     Past Surgical History:  S/P hernia surgery  left inguinal at 19 years old. Reason for Admission	"left hip replacement"	     History of Present Illness:  History of Present Illness		  52 yo male, PMH OA of left hip and right knee for over 2 years, has been managed with NSAIDs but in the past 3 months pain on left  hip has become worse without any relief. Pt. presents to [facility] today for scheduled Left Anterior Total Hip Replacement on 2/8/19. Denies recent fever, chills, chest pain, SOB, changes in bowel/urinary habits.    Allergies/Medications:   Allergies:        Allergies:  	No Known Allergies:     Home Medications:   * Patient Currently Takes Medications as of 28-Jan-2019 19:07 documented in Structured Notes  · 	Mobic 15 mg oral tablet: Last Dose Taken:  , 1 tab(s) orally once a day (in the evening)  · 	Wellbutrin 100 mg oral tablet: Last Dose Taken:  , 1 tab(s) orally once a day at 1 pm    PMH/PSH/FH/SH:    Past Medical History:  OA (osteoarthritis) of hip  left  OA (osteoarthritis) of knee  right  Smoker  Quit 1/20/19 - taking wellbutrin.     Past Surgical History:  S/P hernia surgery  left inguinal at 19 years old.    Hospital Course:  54 y/o M underwent Left total hip arthroplasty (anterior approach) on 2/8/19 with .  Patient tolerated procedure well.  Patient was evaluated postoperatively by physical and occupational therapists for weight bearing as tolerated ambulation, anterior hip precautions and cleared patient for discharge home. Patient advised to keep surgical incision/dressing clean and dry, and remove dressing as directed. Follow up with Dr Leyva 1 month post op.

## 2019-02-10 ENCOUNTER — TRANSCRIPTION ENCOUNTER (OUTPATIENT)
Age: 54
End: 2019-02-10

## 2019-02-10 VITALS
DIASTOLIC BLOOD PRESSURE: 72 MMHG | OXYGEN SATURATION: 98 % | SYSTOLIC BLOOD PRESSURE: 112 MMHG | TEMPERATURE: 98 F | RESPIRATION RATE: 18 BRPM | HEART RATE: 88 BPM

## 2019-02-10 PROCEDURE — 86900 BLOOD TYPING SEROLOGIC ABO: CPT

## 2019-02-10 PROCEDURE — 86901 BLOOD TYPING SEROLOGIC RH(D): CPT

## 2019-02-10 PROCEDURE — 85027 COMPLETE CBC AUTOMATED: CPT

## 2019-02-10 PROCEDURE — 80048 BASIC METABOLIC PNL TOTAL CA: CPT

## 2019-02-10 PROCEDURE — C1713: CPT

## 2019-02-10 PROCEDURE — 97535 SELF CARE MNGMENT TRAINING: CPT

## 2019-02-10 PROCEDURE — 97161 PT EVAL LOW COMPLEX 20 MIN: CPT

## 2019-02-10 PROCEDURE — 97165 OT EVAL LOW COMPLEX 30 MIN: CPT

## 2019-02-10 PROCEDURE — 82962 GLUCOSE BLOOD TEST: CPT

## 2019-02-10 PROCEDURE — C1776: CPT

## 2019-02-10 PROCEDURE — 97530 THERAPEUTIC ACTIVITIES: CPT

## 2019-02-10 PROCEDURE — 72170 X-RAY EXAM OF PELVIS: CPT

## 2019-02-10 PROCEDURE — 97116 GAIT TRAINING THERAPY: CPT

## 2019-02-10 RX ORDER — ASPIRIN/CALCIUM CARB/MAGNESIUM 324 MG
1 TABLET ORAL
Qty: 60 | Refills: 0 | OUTPATIENT
Start: 2019-02-10 | End: 2019-03-11

## 2019-02-10 RX ORDER — KETOROLAC TROMETHAMINE 30 MG/ML
15 SYRINGE (ML) INJECTION ONCE
Qty: 0 | Refills: 0 | Status: DISCONTINUED | OUTPATIENT
Start: 2019-02-10 | End: 2019-02-10

## 2019-02-10 RX ORDER — TRAMADOL HYDROCHLORIDE 50 MG/1
1 TABLET ORAL
Qty: 21 | Refills: 0 | OUTPATIENT
Start: 2019-02-10 | End: 2019-02-16

## 2019-02-10 RX ORDER — OMEPRAZOLE 10 MG/1
1 CAPSULE, DELAYED RELEASE ORAL
Qty: 30 | Refills: 0 | OUTPATIENT
Start: 2019-02-10 | End: 2019-03-11

## 2019-02-10 RX ORDER — OXYCODONE HYDROCHLORIDE 5 MG/1
1 TABLET ORAL
Qty: 42 | Refills: 0 | OUTPATIENT
Start: 2019-02-10 | End: 2019-02-16

## 2019-02-10 RX ORDER — ACETAMINOPHEN 500 MG
3 TABLET ORAL
Qty: 0 | Refills: 0 | COMMUNITY
Start: 2019-02-10

## 2019-02-10 RX ADMIN — Medication 975 MILLIGRAM(S): at 01:10

## 2019-02-10 RX ADMIN — POLYETHYLENE GLYCOL 3350 17 GRAM(S): 17 POWDER, FOR SOLUTION ORAL at 14:04

## 2019-02-10 RX ADMIN — Medication 100 MILLIGRAM(S): at 06:27

## 2019-02-10 RX ADMIN — OXYCODONE HYDROCHLORIDE 10 MILLIGRAM(S): 5 TABLET ORAL at 00:38

## 2019-02-10 RX ADMIN — Medication 975 MILLIGRAM(S): at 17:30

## 2019-02-10 RX ADMIN — CELECOXIB 200 MILLIGRAM(S): 200 CAPSULE ORAL at 07:00

## 2019-02-10 RX ADMIN — Medication 15 MILLIGRAM(S): at 15:40

## 2019-02-10 RX ADMIN — Medication 975 MILLIGRAM(S): at 08:09

## 2019-02-10 RX ADMIN — CELECOXIB 200 MILLIGRAM(S): 200 CAPSULE ORAL at 17:08

## 2019-02-10 RX ADMIN — OXYCODONE HYDROCHLORIDE 10 MILLIGRAM(S): 5 TABLET ORAL at 07:00

## 2019-02-10 RX ADMIN — Medication 100 MILLIGRAM(S): at 14:04

## 2019-02-10 RX ADMIN — Medication 30 MILLIGRAM(S): at 06:32

## 2019-02-10 RX ADMIN — CELECOXIB 200 MILLIGRAM(S): 200 CAPSULE ORAL at 17:30

## 2019-02-10 RX ADMIN — Medication 325 MILLIGRAM(S): at 17:08

## 2019-02-10 RX ADMIN — Medication 975 MILLIGRAM(S): at 00:35

## 2019-02-10 RX ADMIN — PANTOPRAZOLE SODIUM 40 MILLIGRAM(S): 20 TABLET, DELAYED RELEASE ORAL at 06:31

## 2019-02-10 RX ADMIN — Medication 975 MILLIGRAM(S): at 17:07

## 2019-02-10 RX ADMIN — Medication 500 MILLIGRAM(S): at 17:06

## 2019-02-10 RX ADMIN — Medication 975 MILLIGRAM(S): at 08:40

## 2019-02-10 RX ADMIN — Medication 500 MILLIGRAM(S): at 06:27

## 2019-02-10 RX ADMIN — OXYCODONE HYDROCHLORIDE 10 MILLIGRAM(S): 5 TABLET ORAL at 14:04

## 2019-02-10 RX ADMIN — Medication 325 MILLIGRAM(S): at 06:31

## 2019-02-10 RX ADMIN — Medication 1 TABLET(S): at 14:04

## 2019-02-10 RX ADMIN — Medication 15 MILLIGRAM(S): at 15:17

## 2019-02-10 RX ADMIN — OXYCODONE HYDROCHLORIDE 10 MILLIGRAM(S): 5 TABLET ORAL at 06:26

## 2019-02-10 RX ADMIN — OXYCODONE HYDROCHLORIDE 10 MILLIGRAM(S): 5 TABLET ORAL at 14:35

## 2019-02-10 RX ADMIN — Medication 1 MILLIGRAM(S): at 14:04

## 2019-02-10 RX ADMIN — Medication 30 MILLIGRAM(S): at 07:00

## 2019-02-10 RX ADMIN — OXYCODONE HYDROCHLORIDE 10 MILLIGRAM(S): 5 TABLET ORAL at 01:10

## 2019-02-10 RX ADMIN — CELECOXIB 200 MILLIGRAM(S): 200 CAPSULE ORAL at 06:28

## 2019-02-10 NOTE — PROGRESS NOTE ADULT - ASSESSMENT
54 y/o M s/p left total hip arthroplasty POD#2, physical therapy, anterior total hip precautions  More Montes PA-C  Orthopaedic Surgery  Team pager [phone]  [facility] [phone]  [phone]

## 2019-02-10 NOTE — PROGRESS NOTE ADULT - SUBJECTIVE AND OBJECTIVE BOX
Patient is a 53y old  Male who presents with a chief complaint of Left hip arthritis   Patient s/p left total hip arthroplasty (anterior approach)  POST OPERATIVE DAY #:  [2 ]   Patient comfortable  No complaints    T(C): 36.9 (02-10-19 @ 00:36), Max: 37 (02-09-19 @ 16:15)  HR: 82 (02-10-19 @ 00:36) (80 - 98)  BP: 93/54 (02-10-19 @ 00:36) (93/54 - 122/74)  RR: 18 (02-10-19 @ 00:36) (16 - 18)  SpO2: 98% (02-10-19 @ 00:36) (96% - 99%)    PHYSICAL EXAM:  NAD, Alert  [Left ] Hip: Aquacel dressing C/D/I; sensation grossly intact to light touch; (+) DF/PF; (+) Distal Pulses; No Calf tenderness B/L, PAS   LABS:                     9.9    9.61  )-----------( 183      ( 09 Feb 2019 10:21 )             30.8   02-09  140  |  108  |  11  ----------------------------<  106<H>  3.8   |  24  |  0.64  Ca    7.8<L>      09 Feb 2019 08:09

## 2019-02-11 ENCOUNTER — CHART COPY (OUTPATIENT)
Age: 54
End: 2019-02-11

## 2019-02-11 ENCOUNTER — INBOUND DOCUMENT (OUTPATIENT)
Age: 54
End: 2019-02-11

## 2019-02-11 PROBLEM — M16.9 OSTEOARTHRITIS OF HIP, UNSPECIFIED: Chronic | Status: ACTIVE | Noted: 2019-01-28

## 2019-02-11 PROBLEM — M17.10 UNILATERAL PRIMARY OSTEOARTHRITIS, UNSPECIFIED KNEE: Chronic | Status: ACTIVE | Noted: 2019-01-28

## 2019-02-11 PROBLEM — F17.200 NICOTINE DEPENDENCE, UNSPECIFIED, UNCOMPLICATED: Chronic | Status: ACTIVE | Noted: 2019-01-28

## 2019-02-14 ENCOUNTER — RX RENEWAL (OUTPATIENT)
Age: 54
End: 2019-02-14

## 2019-02-15 ENCOUNTER — RX RENEWAL (OUTPATIENT)
Age: 54
End: 2019-02-15

## 2019-02-15 ENCOUNTER — CHART COPY (OUTPATIENT)
Age: 54
End: 2019-02-15

## 2019-02-21 DIAGNOSIS — G89.18 OTHER ACUTE POSTPROCEDURAL PAIN: ICD-10-CM

## 2019-02-22 ENCOUNTER — MEDICATION RENEWAL (OUTPATIENT)
Age: 54
End: 2019-02-22

## 2019-02-28 ENCOUNTER — RX RENEWAL (OUTPATIENT)
Age: 54
End: 2019-02-28

## 2019-03-01 ENCOUNTER — OUTPATIENT (OUTPATIENT)
Dept: OUTPATIENT SERVICES | Facility: HOSPITAL | Age: 54
LOS: 1 days | End: 2019-03-01
Payer: MEDICAID

## 2019-03-01 DIAGNOSIS — Z98.890 OTHER SPECIFIED POSTPROCEDURAL STATES: Chronic | ICD-10-CM

## 2019-03-01 PROCEDURE — G9001: CPT

## 2019-03-05 ENCOUNTER — RX RENEWAL (OUTPATIENT)
Age: 54
End: 2019-03-05

## 2019-03-08 DIAGNOSIS — Z71.89 OTHER SPECIFIED COUNSELING: ICD-10-CM

## 2019-03-12 ENCOUNTER — APPOINTMENT (OUTPATIENT)
Dept: ORTHOPEDIC SURGERY | Facility: CLINIC | Age: 54
End: 2019-03-12
Payer: MEDICAID

## 2019-03-12 VITALS — WEIGHT: 270 LBS | HEIGHT: 71 IN | BODY MASS INDEX: 37.8 KG/M2

## 2019-03-12 PROCEDURE — 73502 X-RAY EXAM HIP UNI 2-3 VIEWS: CPT | Mod: LT

## 2019-03-12 PROCEDURE — 99024 POSTOP FOLLOW-UP VISIT: CPT

## 2019-03-12 NOTE — REVIEW OF SYSTEMS
[Arthralgia] : no arthralgia [Joint Pain] : no joint pain [Joint Stiffness] : no joint stiffness [Joint Swelling] : no joint swelling [Negative] : Heme/Lymph

## 2019-03-12 NOTE — DISCUSSION/SUMMARY
[de-identified] : 1 month status post left total hip arthroplasty doing very well.  Written infectious precautions were reviewed. The patient will progress to a cane at this time. Aspirin therapy will be discontinued for the purpose of orthopedic thromboembolism prophylaxis. Return in four to five months for follow-up evaluation.

## 2019-03-12 NOTE — PHYSICAL EXAM
[de-identified] : Examination reveals satisfactory wound healing. No surrounding erythema. Motion is good and relatively pain free. Leg lengths are acceptable. [de-identified] : AP pelvis, AP hip, and lateral x-rays of the left hip were reviewed. Satisfactory position and alignment of the components are present. No signs of loosening are seen.

## 2019-03-12 NOTE — HISTORY OF PRESENT ILLNESS
[de-identified] : Status-post left total hip arthroplasty here for evaluation. Excellent progress is noted in terms of pain and restoration of function. Pain is well controlled with oral medications. There has been no change in medical health since discharge. The patient does not require assistive devices.  He will be starting outpatient physical therapy soon but has been doing home physical therapy as well as her virtual physical therapy program.  He is thrilled with his outcome and finally feels like he has his life back.

## 2019-03-22 ENCOUNTER — MEDICATION RENEWAL (OUTPATIENT)
Age: 54
End: 2019-03-22

## 2019-04-30 ENCOUNTER — APPOINTMENT (OUTPATIENT)
Dept: ORTHOPEDIC SURGERY | Facility: CLINIC | Age: 54
End: 2019-04-30
Payer: MEDICAID

## 2019-04-30 VITALS — BODY MASS INDEX: 37.1 KG/M2 | HEIGHT: 71 IN | WEIGHT: 265 LBS

## 2019-04-30 PROCEDURE — 20610 DRAIN/INJ JOINT/BURSA W/O US: CPT | Mod: 79,RT

## 2019-04-30 PROCEDURE — 99214 OFFICE O/P EST MOD 30 MIN: CPT | Mod: 24,25

## 2019-04-30 PROCEDURE — 73564 X-RAY EXAM KNEE 4 OR MORE: CPT | Mod: RT

## 2019-04-30 NOTE — PHYSICAL EXAM
[de-identified] : Long standing knee, AP knee, lateral knee, and patellar views of the right knee were ordered and taken in the office and demonstrate degenerative joint disease of the knee with joint space narrowing, osteophyte formation, and subchondral sclerosis.  Again the previously noted enthesophyte is noted. [de-identified] : Patient is well nourished, well-developed, in no acute distress, with appropriate mood and affect. The patient is oriented to time, place, and person. Respirations are even and unlabored. Gait evaluation does reveal a limp. There is no inguinal adenopathy. Examination of the contralateral knee shows normal range of motion, strength, no tenderness, and intact skin. The affected limb is well-perfused, without skin lesions, shows a grossly normal motor and sensory examination. Knee motion is significantly reduced and does cause significant pain. The right knee moves from 0-125 degrees. The knee is stable within that range-of-motion to AP and ML stress. The alignment of the knee is 5 degrees of varus. Muscle strength is normal. Pedal pulses are palpable. Hip examination was negative.\par

## 2019-04-30 NOTE — HISTORY OF PRESENT ILLNESS
[de-identified] : This very pleasant 53-year-old gentleman who comes in complaining of chronic right knee pain.  Pain is moderate to severe nature.  I previously diagnosed him with right knee osteoarthritis.  He has been recovering very well from a total hip arthroplasty and is thrilled with his outcome.  He is now ambulating without any assistive devices.  Previously treated his right knee with an intra-articular cortisone injection more than 6 months ago which helped significantly.  He denies any catching or clicking or locking in the knee and the knee is not giving way on him.  This pain does limit his activities of daily living.  It is limiting his walking tolerance.  He has difficulty with stairs stairs and squatting.  He is no longer taking oral NSAIDs.  He is not currently doing physical therapy.

## 2019-04-30 NOTE — REVIEW OF SYSTEMS
[Arthralgia] : arthralgia [Joint Pain] : joint pain [Joint Stiffness] : joint stiffness [Negative] : Endocrine

## 2019-04-30 NOTE — DISCUSSION/SUMMARY
[de-identified] : This patient has right knee osteoarthritis.  The patient is not an appropriate candidate for a total knee arthroplasty at this time. An extensive discussion was conducted on the natural history of the disease and the variety of surgical and non-surgical options available to the patient including, but not limited to non-steroidal anti-inflammatory medications, steroid injections, physical therapy, maintenance of ideal body weight, and reduction of activity.  Today we performed a right knee intra-articular cortisone injection.  The patient will schedule an appointment as needed.\par \par Informed consent for the right knee injection was obtained. All questions were answered. A time out was performed. The right knee was prepped and draped in sterile fashion. Using sterile technique, the right knee was injection of 2cc of Kenalog, 4cc of 1% lidocaine, 2cc of 0.5% marcaine using a 21-gauge needle. A sterile dressing was applied. Post injection instructions were reviewed. The patient tolerated the procedure well.\par

## 2019-08-09 ENCOUNTER — APPOINTMENT (OUTPATIENT)
Dept: OTOLARYNGOLOGY | Facility: CLINIC | Age: 54
End: 2019-08-09
Payer: MEDICAID

## 2019-08-09 VITALS
DIASTOLIC BLOOD PRESSURE: 77 MMHG | SYSTOLIC BLOOD PRESSURE: 116 MMHG | BODY MASS INDEX: 37.1 KG/M2 | WEIGHT: 265 LBS | HEART RATE: 67 BPM | HEIGHT: 71 IN

## 2019-08-09 PROCEDURE — 99204 OFFICE O/P NEW MOD 45 MIN: CPT | Mod: 25

## 2019-08-09 PROCEDURE — G0268 REMOVAL OF IMPACTED WAX MD: CPT

## 2019-08-09 PROCEDURE — 92557 COMPREHENSIVE HEARING TEST: CPT

## 2019-08-09 PROCEDURE — 92588 EVOKED AUDITORY TST COMPLETE: CPT

## 2019-08-09 PROCEDURE — 92567 TYMPANOMETRY: CPT

## 2019-08-09 NOTE — PHYSICAL EXAM
[Midline] : trachea located in midline position [Normal] : no rashes [de-identified] : MELISSA hernandes

## 2019-08-09 NOTE — REASON FOR VISIT
[Initial Evaluation] : an initial evaluation for [Hearing Loss] : hearing loss [Tinnitus] : tinnitus [FreeTextEntry2] : hearing loss and ringing in the L ear

## 2019-08-09 NOTE — ASSESSMENT
[FreeTextEntry1] : 55 y/o male who came in complaining of hearing loss L >R and tinnitus in the L ear. He has a R cerumen impaction but his L ear canal was patent. There ar no signs of infection or TM  perforation.\par Wax-\par After informed verbal consent is obtained, cerumen is removed from the right ear canal with a curette and suction.\par Rx: \par Debrox was prescribed and  is to be placed in both ears on a routine basis to keep them free of wax.\par Routine debridement suggested to keep the ears free of wax.\par \par Hearing loss L>R and TInnitus in the L \par -Audiogram performed in office to r/o asymmetrical hearing loss-left SNHL\par r/p Barotrauma and healed Perilymph Fistula from sneeze in left ear\par Rx-MRI poss CT scan of left ear\par Acupunture and avoid loud noises to tx timmitus\par poss CROS HAE vs BAHA vs CI

## 2019-08-09 NOTE — CONSULT LETTER
[Dear  ___] : Dear  [unfilled], [Courtesy Letter:] : I had the pleasure of seeing your patient, [unfilled], in my office today. [Please see my note below.] : Please see my note below. [Consult Closing:] : Thank you very much for allowing me to participate in the care of this patient.  If you have any questions, please do not hesitate to contact me. [Sincerely,] : Sincerely, [FreeTextEntry3] : Jonnathan Ceja MD\par [facility]\par Otolaryngology & Facial Plastics @ [place]\par [address] [place] [address] [address]\par [place] [address]\par Tel: [phone]\par Fax: [phone]\par \par

## 2019-08-09 NOTE — HISTORY OF PRESENT ILLNESS
[No] : patient does not have a  history of radiation therapy [Tinnitus] : tinnitus [Hearing Loss] : hearing loss [de-identified] : 53 y/o male who came in complaining of hearing the ocean in the L ear that started in November after he blew his nose. He states when this happened he became dizzy but it subside shortly. He no longer has any dizzy spells. He states he has occasional nasal congestion. He states his hearing has been getting worse in L>R. He has no throat or nose issues. Pt has no ear pain, ear drainage, vertigo, nasal congestion, nasal discharge, epistaxis, sinus infections, facial pain, facial pressure, throat pain, dysphagia or fevers.\par   [Anxiety] : no anxiety [Dizziness] : no dizziness [Headache] : no headache [Otalgia] : no otalgia [Otorrhea] : no otorrhea [Vertigo] : no vertigo [Recurrent Otitis Media] : no recurrent otitis media [Otitis Media with Effusion] : no otitis media with effusion [Congenital Ear Malformation] : no congenital ear malformation [Meniere Disease] : no Meniere disease [Otosclerosis] : no otosclerosis [Hypertension] : no hypertension [Perilymphatic Fistula] : no perilymphatic fistula [Smoking] : no smoking [Loud Noise Exposure] : no history of loud noise exposure [Early Onset Hearing Loss] : no early onset hearing loss [Stroke] : no stroke [Facial Pain] : no facial pain [Clear Rhinorrhea] : no clear rhinorrhea [Facial Pressure] : no facial pressure [Purulent Rhinorrhea] : no purulent rhinorrhea [Nasal Congestion] : no nasal congestion [Postnasal Drainage] : no postnasal drainage [Ear Pain] : no ear pain [Ear Pressure] : no ear pressure [Diplopia] : no diplopia [Ear Fullness] : no ear fullness [Allergic Rhinitis] : no allergic rhinitis [Seasonal Allergies] : no seasonal allergies [Environmental Allergies] : no environmental allergies [Environmental Allergens] : no environmental allergens [Adenoidectomy] : no adenoidectomy [Allergies] : no allergies [Asthma] : no asthma [Neck Mass] : no neck mass [Neck Pain] : no neck pain [Cold Intolerance] : no cold intolerance [Chills] : no chills [Cough] : no cough [Fatigue] : no fatigue [Heat Intolerance] : no heat intolerance [Sialadenitis] : no sialadenitis [Hyperthyroidism] : no hyperthyroidism [Hodgkin Disease] : no hodgkin disease [Tobacco Use] : no tobacco use [Non-Hodgkin Lymphoma] : no non-hodgkin lymphoma [Graves Disease] : no graves disease [Alcohol Use] : no alcohol use [Thyroid Cancer] : no thyroid cancer

## 2019-08-13 ENCOUNTER — APPOINTMENT (OUTPATIENT)
Dept: ORTHOPEDIC SURGERY | Facility: CLINIC | Age: 54
End: 2019-08-13
Payer: MEDICAID

## 2019-08-20 ENCOUNTER — APPOINTMENT (OUTPATIENT)
Dept: ORTHOPEDIC SURGERY | Facility: CLINIC | Age: 54
End: 2019-08-20
Payer: MEDICAID

## 2019-08-20 PROCEDURE — 73502 X-RAY EXAM HIP UNI 2-3 VIEWS: CPT | Mod: LT

## 2019-08-20 PROCEDURE — 99213 OFFICE O/P EST LOW 20 MIN: CPT | Mod: 25

## 2019-08-20 PROCEDURE — 20610 DRAIN/INJ JOINT/BURSA W/O US: CPT | Mod: RT

## 2019-08-20 NOTE — REASON FOR VISIT
[Follow-Up Visit] : a follow-up visit for [Artificial Hip Joint] : artificial hip joint [Osteoarthritis, Knee] : osteoarthritis, knee

## 2019-08-20 NOTE — DISCUSSION/SUMMARY
[de-identified] : This patient is doing very well 6-month status post left total hip arthroplasty.  He has right knee osteoarthritis that we will continue with conservative management. The patient is not an appropriate candidate for total knee arthroplasty at this time. An extensive discussion was conducted on the natural history of the disease and the variety of surgical and non-surgical options available to the patient including, but not limited to non-steroidal anti-inflammatory medications, steroid injections, physical therapy, maintenance of ideal body weight, and reduction of activity.  Today we performed a right knee intra-articular cortisone injection.  I also prescribed a course of physical therapy for the right knee as well as for some gait instability overall.  The patient will schedule an appointment as needed for the right knee.  Regarding the left hip he needs to follow-up with me every 3 to 5 years.\par \par Informed consent for the right knee injection was obtained. All questions were answered. A time out was performed. The right knee was prepped and draped in sterile fashion. Using sterile technique, the right knee was injection of 2cc of Kenalog, 4cc of 1% lidocaine, 2cc of 0.5% marcaine using a 21-gauge needle. A sterile dressing was applied. Post injection instructions were reviewed. The patient tolerated the procedure well.\par

## 2019-08-20 NOTE — HISTORY OF PRESENT ILLNESS
[de-identified] : There is a very pleasant 54-year-old gentleman now 6-month status post left total hip arthroplasty doing very well.  I also seen him in the past for right knee osteoarthritis.  The pain in the right knee does limit his activities of daily living mildly.  His walking tolerance overall is not reduced.  He does not take NSAIDs for pain.  He is not currently doing physical therapy but has done these in the past.  He is previously responded very well to intra-articular cortisone injections for the right knee.  The left hip has made a full recovery and is thrilled with his overall.  He does not use a cane or walker.  The patient denies any radiation of the pain to the feet and it is not associated with numbness, tingling, or weakness.

## 2019-08-20 NOTE — PHYSICAL EXAM
[de-identified] : Patient is well nourished, well-developed, in no acute distress, with appropriate mood and affect. The patient is oriented to time, place, and person. Respirations are even and unlabored. Gait evaluation does not reveal a limp. There is no inguinal adenopathy. Examination of the contralateral hip shows normal range of motion, strength, no tenderness, and intact skin. The affected limb is well-perfused and showed 2+ dp/pt pulses, without skin lesions, shows a grossly normal motor and sensory examination. Examination of the hip shows a well-healed surgical scar. Hip motion is full and painless from 0-90 degrees extension to flexion, 20 degrees adduction and 20 degrees abduction, and 15 degrees internal and 30 degrees external rotation. Leg lengths are approximately equal. Both hips are stable and muscle strength is normal with good strength with resisted abduction and adduction. Pedal pulses are palpable.  Right knee motion is significantly reduced and does cause significant pain. The right knee moves from 0-125 degrees. The knee is stable within that range-of-motion to AP and ML stress. The alignment of the knee is 5 degrees varus. Muscle strength is normal. Pedal pulses are palpable. Hip examination was negative.\par  [de-identified] : AP pelvis, AP hip, and lateral x-rays of the left hip were reviewed. Satisfactory position and alignment of the components are present. No signs of loosening are seen.

## 2019-08-26 ENCOUNTER — APPOINTMENT (OUTPATIENT)
Dept: MRI IMAGING | Facility: CLINIC | Age: 54
End: 2019-08-26

## 2019-09-30 ENCOUNTER — APPOINTMENT (OUTPATIENT)
Dept: MRI IMAGING | Facility: CLINIC | Age: 54
End: 2019-09-30
Payer: MEDICAID

## 2019-10-06 ENCOUNTER — FORM ENCOUNTER (OUTPATIENT)
Age: 54
End: 2019-10-06

## 2019-10-07 ENCOUNTER — APPOINTMENT (OUTPATIENT)
Dept: MRI IMAGING | Facility: CLINIC | Age: 54
End: 2019-10-07
Payer: MEDICAID

## 2019-10-07 ENCOUNTER — OUTPATIENT (OUTPATIENT)
Dept: OUTPATIENT SERVICES | Facility: HOSPITAL | Age: 54
LOS: 1 days | End: 2019-10-07
Payer: MEDICAID

## 2019-10-07 DIAGNOSIS — H93.12 TINNITUS, LEFT EAR: ICD-10-CM

## 2019-10-07 DIAGNOSIS — Z98.890 OTHER SPECIFIED POSTPROCEDURAL STATES: Chronic | ICD-10-CM

## 2019-10-07 PROCEDURE — 70553 MRI BRAIN STEM W/O & W/DYE: CPT

## 2019-10-07 PROCEDURE — A9585: CPT

## 2019-10-07 PROCEDURE — 70553 MRI BRAIN STEM W/O & W/DYE: CPT | Mod: 26

## 2019-10-10 ENCOUNTER — MESSAGE (OUTPATIENT)
Age: 54
End: 2019-10-10

## 2019-10-23 ENCOUNTER — MESSAGE (OUTPATIENT)
Age: 54
End: 2019-10-23

## 2019-11-04 ENCOUNTER — OTHER (OUTPATIENT)
Age: 54
End: 2019-11-04

## 2019-11-13 ENCOUNTER — APPOINTMENT (OUTPATIENT)
Dept: OTOLARYNGOLOGY | Facility: CLINIC | Age: 54
End: 2019-11-13

## 2019-11-13 ENCOUNTER — APPOINTMENT (OUTPATIENT)
Dept: ORTHOPEDIC SURGERY | Facility: CLINIC | Age: 54
End: 2019-11-13

## 2020-02-20 NOTE — REVIEW OF SYSTEMS
I called patient's son, Rebeka Gutiérrez, and explained everything listed in my encounter, as well as, Bipin's encounter    He understood and will call his father's insurance and will get an in network urologist  [Patient Intake Form Reviewed] : Patient intake form was reviewed [Negative] : Heme/Lymph

## 2020-08-14 ENCOUNTER — APPOINTMENT (OUTPATIENT)
Dept: OTOLARYNGOLOGY | Facility: CLINIC | Age: 55
End: 2020-08-14
Payer: MEDICAID

## 2020-08-14 VITALS
HEIGHT: 71 IN | SYSTOLIC BLOOD PRESSURE: 125 MMHG | HEART RATE: 104 BPM | BODY MASS INDEX: 38.5 KG/M2 | DIASTOLIC BLOOD PRESSURE: 78 MMHG | TEMPERATURE: 97.4 F | WEIGHT: 275 LBS

## 2020-08-14 DIAGNOSIS — H61.23 IMPACTED CERUMEN, BILATERAL: ICD-10-CM

## 2020-08-14 PROCEDURE — G0268 REMOVAL OF IMPACTED WAX MD: CPT

## 2020-08-14 PROCEDURE — 99213 OFFICE O/P EST LOW 20 MIN: CPT | Mod: 25

## 2020-08-14 PROCEDURE — 92557 COMPREHENSIVE HEARING TEST: CPT

## 2020-08-14 PROCEDURE — 92567 TYMPANOMETRY: CPT

## 2020-08-14 NOTE — DATA REVIEWED
[de-identified] : Left Hearing Loss\par Hearing Test performed to evaluate the extent of hearing loss and  to explain pt's symptoms\par

## 2020-08-14 NOTE — ASSESSMENT
[FreeTextEntry1] : Wax:\par -Cerumen is removed from the right and left  ear canal with a curette and suction.\par -Rx:Debrox be placed in both ears on a routine basis to keep them free of wax.\par -Routine debridement suggested to keep the ears free of wax.\par \par Left Hearing Loss:\par Rec BAHA\par Referred to [facility]

## 2020-08-14 NOTE — HISTORY OF PRESENT ILLNESS
[No] : patient does not have a  history of radiation therapy [de-identified] : 56 yo male\par Patient with hx of tinnitus and mild cerumen impaction presents for routine ear cleaning. States hearing has slightly decreased since last year. Otherwise doing well.\par Pt has no ear pain, ear drainage, tinnitus, vertigo, nasal congestion, nasal discharge, epistaxis, sinus infections, facial pain, facial pressure, throat pain, dysphagia or fevers\par \par  [Dizziness] : no dizziness [Anxiety] : no anxiety [Recurrent Otitis Media] : no recurrent otitis media [Hearing Loss] : no hearing loss [Headache] : no headache [Congenital Ear Malformation] : no congenital ear malformation [Presbycusis] : no presbycusis [Otitis Media with Effusion] : no otitis media with effusion [Meniere Disease] : no Meniere disease [Perilymphatic Fistula] : no perilymphatic fistula [Otosclerosis] : no otosclerosis [Loud Noise Exposure] : no history of loud noise exposure [Hypertension] : no hypertension [Smoking] : no smoking [Stroke] : no stroke [Early Onset Hearing Loss] : no early onset hearing loss [Facial Pressure] : no facial pressure [Facial Pain] : no facial pain [Nasal Congestion] : no nasal congestion [Diplopia] : no diplopia [Ear Fullness] : no ear fullness [Allergic Rhinitis] : no allergic rhinitis [Septal Deviation] : no septal deviation [Maxillary Tooth Infection] : no maxillary tooth infection [Environmental Allergies] : no environmental allergies [Environmental Allergens] : no environmental allergens [Seasonal Allergies] : no seasonal allergies [Adenoidectomy] : no adenoidectomy [Allergies] : no allergies [Nasal FB Removal] : no nasal foreign body removal [Neck Mass] : no neck mass [Asthma] : no asthma [Chills] : no chills [Neck Pain] : no neck pain [Fatigue] : no fatigue [Cold Intolerance] : no cold intolerance [Cough] : no cough [Hyperthyroidism] : no hyperthyroidism [Heat Intolerance] : no heat intolerance [Sialadenitis] : no sialadenitis [Hodgkin Disease] : no hodgkin disease [Non-Hodgkin Lymphoma] : no non-hodgkin lymphoma [Alcohol Use] : no alcohol use [Graves Disease] : no graves disease [Tobacco Use] : no tobacco use [Thyroid Cancer] : no thyroid cancer

## 2020-08-14 NOTE — PHYSICAL EXAM
[Midline] : trachea located in midline position [] : septum deviated bilaterally [Normal] : no masses and lesions seen, face is symmetric [de-identified] : b/l bravon

## 2020-08-19 ENCOUNTER — APPOINTMENT (OUTPATIENT)
Dept: ORTHOPEDIC SURGERY | Facility: CLINIC | Age: 55
End: 2020-08-19
Payer: MEDICAID

## 2020-08-19 VITALS — TEMPERATURE: 97.3 F

## 2020-08-19 VITALS — SYSTOLIC BLOOD PRESSURE: 129 MMHG | HEART RATE: 82 BPM | DIASTOLIC BLOOD PRESSURE: 83 MMHG

## 2020-08-19 DIAGNOSIS — Z96.642 PRESENCE OF LEFT ARTIFICIAL HIP JOINT: ICD-10-CM

## 2020-08-19 PROCEDURE — 73564 X-RAY EXAM KNEE 4 OR MORE: CPT | Mod: RT

## 2020-08-19 PROCEDURE — 99214 OFFICE O/P EST MOD 30 MIN: CPT | Mod: 25

## 2020-08-19 PROCEDURE — 73502 X-RAY EXAM HIP UNI 2-3 VIEWS: CPT | Mod: LT

## 2020-08-19 PROCEDURE — 20610 DRAIN/INJ JOINT/BURSA W/O US: CPT | Mod: RT

## 2020-08-19 NOTE — PHYSICAL EXAM
[de-identified] : Patient is well nourished, well-developed, in no acute distress, with appropriate mood and affect. The patient is oriented to time, place, and person. Respirations are even and unlabored. Gait evaluation does not reveal a limp. There is no inguinal adenopathy. Examination of the contralateral hip shows normal range of motion, strength, no tenderness, and intact skin. The affected limb is well-perfused and showed 2+ dp/pt pulses, without skin lesions, shows a grossly normal motor and sensory examination. Examination of the hip shows a well healed surgical scar. Hip motion is full and painless from 0-90 degrees extension to flexion, 20 degrees adduction and 20 degrees abduction, and 15 degrees internal and 30 degrees external rotation. Leg lengths are approximately equal. Both hips are stable and muscle strength is normal with good strength with resisted abduction and adduction. Pedal pulses are palpable.\par The right limb is well-perfused, without skin lesions, shows a grossly normal motor and sensory examination. Knee right knee is significantly reduced and does cause significant pain. The knee moves from right degrees. The knee is stable within that range-of-motion to AP and ML stress. The alignment of the knee is 5 degrees varus. Muscle strength is normal. Pedal pulses are palpable. Hip examination was negative.\par  [de-identified] : Long standing knee, AP knee, lateral knee, and patellar views of the right knee were ordered and taken in the office and demonstrate degenerative joint disease of the knee with joint space narrowing, osteophyte formation, and subchondral sclerosis.\par \par AP pelvis, AP hip, and lateral x-rays of the left hip were ordered and obtained in the office and demonstrate satisfactory position and alignment of the components are present. No signs of loosening are seen.

## 2020-08-19 NOTE — DISCUSSION/SUMMARY
[de-identified] : Doing very well status post left total of arthroplasty.  Continue to be weight-bear as tolerated.  No restrictions.\par \par He has right knee osteoarthritis.  The patient is not an appropriate candidate for surgical intervention at this time. An extensive discussion was conducted on the natural history of the disease and the variety of surgical and non-surgical options available to the patient including, but not limited to non-steroidal anti-inflammatory medications, steroid injections, physical therapy, maintenance of ideal body weight, and reduction of activity.  Weight loss recommended.  Today we performed a right knee intra-articular cortisone injection.  The patient will schedule an appointment as needed.\par \par Informed consent for the right knee injection was obtained. All questions were answered. A time out was performed. The right knee was prepped and draped in sterile fashion. Using sterile technique, the right knee was injected with 2cc of Kenalog, 4cc of 1% lidocaine, 4cc of 0.25% marcaine using a 21-gauge needle. A sterile dressing was applied. Post injection instructions were reviewed. The patient tolerated the procedure well.\par

## 2020-08-19 NOTE — HISTORY OF PRESENT ILLNESS
[de-identified] : There is a very pleasant 55-year-old gentleman now 1.5 years status post left total hip arthroplasty doing very well, also been treated in the past for right knee osteoarthritis.  The left hip has had a very successful outcome with and is thrilled with the outcome.  The right knee pain does bother him intermittently on a chronic basis.  He has had pain there for more than 6 months.  The pain in the right knee does limit his activities of daily living moderately.  His walking tolerance overall is mildly reduced. He does not take NSAIDs for pain. He is not currently doing physical therapy but has done these in the past.  His body weight has been fluctuating.  He is previously responded very well to intra-articular cortisone injections for the right knee, last injection August 2019. The left hip has made a full recovery and is thrilled with his recovery.  He is not requiring a cane or walker.  The patient denies any radiation of the pain to the feet and it is not associated with numbness, tingling, or weakness.

## 2020-09-16 ENCOUNTER — APPOINTMENT (OUTPATIENT)
Dept: OTOLARYNGOLOGY | Facility: CLINIC | Age: 55
End: 2020-09-16

## 2020-09-18 ENCOUNTER — OUTPATIENT (OUTPATIENT)
Dept: OUTPATIENT SERVICES | Facility: HOSPITAL | Age: 55
LOS: 1 days | Discharge: HOME | End: 2020-09-18

## 2020-09-18 ENCOUNTER — APPOINTMENT (OUTPATIENT)
Dept: OTOLARYNGOLOGY | Facility: CLINIC | Age: 55
End: 2020-09-18
Payer: MEDICAID

## 2020-09-18 VITALS
WEIGHT: 270 LBS | HEIGHT: 71 IN | DIASTOLIC BLOOD PRESSURE: 84 MMHG | SYSTOLIC BLOOD PRESSURE: 118 MMHG | BODY MASS INDEX: 37.8 KG/M2 | HEART RATE: 82 BPM

## 2020-09-18 DIAGNOSIS — T70.0XXA OTITIC BAROTRAUMA, INITIAL ENCOUNTER: ICD-10-CM

## 2020-09-18 DIAGNOSIS — H90.42 SENSORINEURAL HEARING LOSS, UNILATERAL, LEFT EAR, WITH UNRESTRICTED HEARING ON THE CONTRALATERAL SIDE: ICD-10-CM

## 2020-09-18 DIAGNOSIS — Z98.890 OTHER SPECIFIED POSTPROCEDURAL STATES: Chronic | ICD-10-CM

## 2020-09-18 DIAGNOSIS — H93.12 TINNITUS, LEFT EAR: ICD-10-CM

## 2020-09-18 PROCEDURE — 99214 OFFICE O/P EST MOD 30 MIN: CPT

## 2020-09-18 RX ORDER — MUPIROCIN 20 MG/G
2 OINTMENT TOPICAL
Qty: 1 | Refills: 0 | Status: COMPLETED | COMMUNITY
Start: 2019-01-31 | End: 2020-09-18

## 2020-09-18 RX ORDER — SERTRALINE HYDROCHLORIDE 50 MG/1
50 TABLET, FILM COATED ORAL
Refills: 0 | Status: COMPLETED | COMMUNITY
End: 2020-09-18

## 2020-09-18 RX ORDER — TRAMADOL HYDROCHLORIDE 50 MG/1
50 TABLET, COATED ORAL
Qty: 20 | Refills: 0 | Status: COMPLETED | COMMUNITY
Start: 2019-03-22 | End: 2020-09-18

## 2020-09-18 RX ORDER — IBUPROFEN 600 MG/1
600 TABLET, FILM COATED ORAL EVERY 6 HOURS
Qty: 30 | Refills: 0 | Status: COMPLETED | COMMUNITY
Start: 2019-02-14 | End: 2020-09-18

## 2020-09-18 RX ORDER — HYALURONATE SODIUM 20 MG/2 ML
20 SYRINGE (ML) INTRAARTICULAR
Qty: 1 | Refills: 0 | Status: COMPLETED | OUTPATIENT
Start: 2019-11-04 | End: 2020-09-18

## 2020-09-18 RX ORDER — NAPROXEN 500 MG/1
500 TABLET ORAL
Qty: 60 | Refills: 0 | Status: COMPLETED | COMMUNITY
Start: 2019-02-25 | End: 2020-09-18

## 2020-09-18 RX ORDER — OXYCODONE 5 MG/1
5 TABLET ORAL
Qty: 42 | Refills: 0 | Status: COMPLETED | COMMUNITY
Start: 2019-02-15 | End: 2020-09-18

## 2020-09-18 RX ORDER — IBUPROFEN 800 MG
TABLET ORAL
Refills: 0 | Status: COMPLETED | COMMUNITY
End: 2020-09-18

## 2020-09-18 RX ORDER — MELOXICAM 15 MG/1
15 TABLET ORAL DAILY
Qty: 30 | Refills: 2 | Status: COMPLETED | COMMUNITY
Start: 2019-01-22 | End: 2020-09-18

## 2020-09-18 RX ORDER — DOCUSATE SODIUM 100 MG/1
100 CAPSULE ORAL TWICE DAILY
Qty: 15 | Refills: 0 | Status: COMPLETED | COMMUNITY
Start: 2019-02-14 | End: 2020-09-18

## 2020-09-18 RX ORDER — SENNOSIDES 8.6 MG TABLETS 8.6 MG/1
8.6 TABLET ORAL
Qty: 30 | Refills: 0 | Status: COMPLETED | COMMUNITY
Start: 2019-02-14 | End: 2020-09-18

## 2020-09-18 RX ORDER — OXYCODONE 5 MG/1
5 TABLET ORAL EVERY 8 HOURS
Qty: 21 | Refills: 0 | Status: COMPLETED | COMMUNITY
Start: 2019-02-22 | End: 2020-09-18

## 2020-09-18 RX ORDER — TRAMADOL HYDROCHLORIDE 50 MG/1
50 TABLET, COATED ORAL
Qty: 40 | Refills: 0 | Status: COMPLETED | COMMUNITY
Start: 2019-02-21 | End: 2020-09-18

## 2020-09-18 RX ORDER — OXYCODONE 5 MG/1
5 TABLET ORAL
Qty: 50 | Refills: 0 | Status: COMPLETED | COMMUNITY
Start: 2019-02-25 | End: 2020-09-18

## 2020-09-18 RX ORDER — MELOXICAM 15 MG/1
15 TABLET ORAL DAILY
Qty: 30 | Refills: 2 | Status: COMPLETED | COMMUNITY
Start: 2018-11-05 | End: 2020-09-18

## 2020-09-18 RX ORDER — TRAMADOL HYDROCHLORIDE 50 MG/1
50 TABLET, COATED ORAL
Qty: 20 | Refills: 0 | Status: COMPLETED | COMMUNITY
Start: 2019-03-05 | End: 2020-09-18

## 2020-09-18 RX ORDER — CLINDAMYCIN HYDROCHLORIDE 150 MG/1
150 CAPSULE ORAL
Qty: 4 | Refills: 0 | Status: COMPLETED | COMMUNITY
Start: 2019-02-28 | End: 2020-09-18

## 2020-09-18 NOTE — PHYSICAL EXAM
[Binocular Microscopic Exam] : Binocular microscopic exam was performed [Rinne Test Air Conduction Persists > Bone Conduction Right] : air conduction greater than bone conduction on the right [Rinne Test Air Conduction Persists > Bone Conduction Left] : air conduction greater than bone conduction on the left [Bray Test Lateralizes To Left] : tone lateralization to the left [Normal] : no rashes [Hearing Loss Right Only] : normal [Hearing Loss Left Only] : normal [de-identified] : thick [FreeTextEntry2] : Facial nerve function is House Brackmann 1/6 in right ear and 1/6 in left ear.

## 2020-09-18 NOTE — HISTORY OF PRESENT ILLNESS
[de-identified] : 55 year male referred by Dr. Jonnathan Ceja, for left tinnitus and hearing loss. Reports constant left tinnitus for the past 2 years, after he blew his nose one day. States varies in intensity. Denies head/otologic trauma. Denies new medication. Family history of hearing loss, grandparents due to old age. MRI 10/7/2019. States listened to loud music in the past. History of bilateral clogged ears. Denies tinnitus preventing from falling asleep at night. Denies otalgia, otorrhea, ear infections in the last 6 months.  Had sudden left hearing loss, possibly from barotrauma after blowing nose, had dizziness around the time of onset but this has since resolved.

## 2020-09-18 NOTE — DATA REVIEWED
[de-identified] : I personally reviewed the patient's audiogram, which shows\par mild to profound mixed hearing AS, poor speech discrim\par mild to moderate SNHL AD\par type A tymps bilaterally [de-identified] : MRI

## 2020-09-18 NOTE — REASON FOR VISIT
[Subsequent Evaluation] : a subsequent evaluation for [FreeTextEntry2] : referred by Dr. Jonnathan Ardon, for left tinnitus and bilateral hearing loss.

## 2020-10-09 DIAGNOSIS — H93.12 TINNITUS, LEFT EAR: ICD-10-CM

## 2020-10-09 DIAGNOSIS — T70.0XXA OTITIC BAROTRAUMA, INITIAL ENCOUNTER: ICD-10-CM

## 2020-10-09 DIAGNOSIS — H90.42 SENSORINEURAL HEARING LOSS, UNILATERAL, LEFT EAR, WITH UNRESTRICTED HEARING ON THE CONTRALATERAL SIDE: ICD-10-CM

## 2020-12-11 ENCOUNTER — TRANSCRIPTION ENCOUNTER (OUTPATIENT)
Age: 55
End: 2020-12-11

## 2020-12-16 DIAGNOSIS — Z00.00 ENCOUNTER FOR GENERAL ADULT MEDICAL EXAMINATION W/OUT ABNORMAL FINDINGS: ICD-10-CM

## 2020-12-29 ENCOUNTER — APPOINTMENT (OUTPATIENT)
Dept: ORTHOPEDIC SURGERY | Facility: CLINIC | Age: 55
End: 2020-12-29

## 2021-01-05 ENCOUNTER — APPOINTMENT (OUTPATIENT)
Dept: ORTHOPEDIC SURGERY | Facility: CLINIC | Age: 56
End: 2021-01-05
Payer: MEDICAID

## 2021-01-05 VITALS — TEMPERATURE: 95 F

## 2021-01-05 PROCEDURE — 99214 OFFICE O/P EST MOD 30 MIN: CPT | Mod: 25

## 2021-01-05 PROCEDURE — 99072 ADDL SUPL MATRL&STAF TM PHE: CPT

## 2021-01-05 PROCEDURE — 20610 DRAIN/INJ JOINT/BURSA W/O US: CPT | Mod: RT

## 2021-01-05 NOTE — DISCUSSION/SUMMARY
[de-identified] : The patient has osteoarthritis.  It is worsening despite conservative management.  He does not want to proceed with surgery at this time. An extensive discussion was conducted on the natural history of the disease and the variety of surgical and non-surgical options available to the patient including, but not limited to non-steroidal anti-inflammatory medications, steroid injections, physical therapy, maintenance of ideal body weight, and reduction of activity. I recommended and prescribed a course of Mobic and physical therapy. For the KNEE, patient is also encouraged to trial a neoprene sleeve. The patient is also encouraged to consider use of a cane. The patient will schedule an appointment as needed.\par \par Informed consent for the right knee injection was obtained. All questions were answered. A time out was performed. The  knee was prepped and draped in sterile fashion. Using sterile technique, 2cc of Kenalog, 4cc of 1% lidocaine, 4cc of 0.25% marcaine using a 21-gauge needle. A sterile dressing was applied. Post injection instructions were reviewed. The patient tolerated the procedure well. \par

## 2021-01-05 NOTE — PHYSICAL EXAM
[de-identified] : Well developed, well nourished in no apparent distress, awake, alert and orientated to person, place and time. with appropriate mood and affect. \par Respirations are even and unlabored. Gait evaluation does reveal a limp. There is no inguinal adenopathy. \par The affected limb is well-perfused, without skin lesions, shows a grossly normal motor and sensory examination. \par Knee motion is significantly reduced and does cause significant pain. ROM of the knee is 5-120 degrees. \par The knee is stable within that range-of-motion to AP and ML stress. \par The alignment of the knee is varus. \par Muscle strength is normal. Pedal pulses are palpable.

## 2021-01-05 NOTE — REVIEW OF SYSTEMS
[Arthralgia] : arthralgia [Joint Pain] : joint pain [Negative] : Heme/Lymph [Joint Stiffness] : joint stiffness

## 2021-01-05 NOTE — HISTORY OF PRESENT ILLNESS
[de-identified] : This is a very nice  55 year old  male experiencing pain in the right knee which is moderate in intensity and has been going on for months.  Pain is worsening.  We have been treating conservatively in the past.  He was last seen in August and received a cortisone injection which worked well. He works at [employer]. The pain substantially limits his walking. Pain and restriction of function are intolerable at this time. The patient denies any radiation of the pain to the feet and it is not associated with numbness, tingling, or weakness.

## 2022-01-18 ENCOUNTER — APPOINTMENT (OUTPATIENT)
Dept: ORTHOPEDIC SURGERY | Facility: CLINIC | Age: 57
End: 2022-01-18

## 2022-02-11 ENCOUNTER — APPOINTMENT (OUTPATIENT)
Dept: ORTHOPEDIC SURGERY | Facility: CLINIC | Age: 57
End: 2022-02-11
Payer: MEDICAID

## 2022-02-11 PROCEDURE — 20610 DRAIN/INJ JOINT/BURSA W/O US: CPT | Mod: RT

## 2022-02-11 NOTE — PROCEDURE
[de-identified] : Zilretta Injection \par \par Injection: right knee joint. \par \par Indication: Osteoarthritis. \par \par A discussion was had with the patient regarding this procedure and all questions were answered. All risks, benefits and alternatives were discussed. These included but were not limited to bleeding, infection, and allergic reaction. Alcohol was used to clean the skin, and betadine was used to sterilize and prep the area in the anterior-medial aspect of the knee. Ethyl chloride spray was then used as a topical anesthetic. A 21-gauge needle was used to inject 32 units of Zilretta and 4cc of 0.25% marcaine into the knee with ease. A sterile bandage was then applied. The patient tolerated the procedure well and there were no complications. \par \par Lot #: [id number]\par Exp: 10-23\par \par The single injection of Zilretta was given today under sterile conditions into the knee joint without complication (see procedure note). I again discussed the role of activity modification/icing following the injection to treat any local irritation from the injection. \par I recommended to the patient to continue with the conservative methods previously documented as well as to follow up with me in the office in 4-6 months for another evaluation.

## 2022-03-07 ENCOUNTER — NON-APPOINTMENT (OUTPATIENT)
Age: 57
End: 2022-03-07

## 2023-05-18 ENCOUNTER — APPOINTMENT (OUTPATIENT)
Dept: ORTHOPEDIC SURGERY | Facility: CLINIC | Age: 58
End: 2023-05-18
Payer: MEDICAID

## 2023-05-18 VITALS — BODY MASS INDEX: 35.08 KG/M2 | WEIGHT: 259 LBS | HEIGHT: 72 IN

## 2023-05-18 DIAGNOSIS — M16.11 UNILATERAL PRIMARY OSTEOARTHRITIS, RIGHT HIP: ICD-10-CM

## 2023-05-18 PROCEDURE — 73502 X-RAY EXAM HIP UNI 2-3 VIEWS: CPT | Mod: RT

## 2023-05-18 PROCEDURE — 20610 DRAIN/INJ JOINT/BURSA W/O US: CPT | Mod: RT

## 2023-05-18 PROCEDURE — 73564 X-RAY EXAM KNEE 4 OR MORE: CPT | Mod: RT

## 2023-05-18 PROCEDURE — 99214 OFFICE O/P EST MOD 30 MIN: CPT | Mod: 25

## 2023-05-18 NOTE — HISTORY OF PRESENT ILLNESS
[de-identified] : This is very nice 57-year-old gentleman experiencing right hip and groin and thigh as well as right knee pain, which is severe in intensity. The pain substantially limits activities of daily living. Walking tolerance is reduced.  Intermittently takes naproxen which helps.  The patient denies any radiation of the pain to the feet and it is not associated with numbness, tingling, or weakness.

## 2023-05-18 NOTE — PHYSICAL EXAM
[de-identified] : Patient is well nourished, well-developed, in no acute distress, with appropriate mood and affect. The patient is oriented to time, place, and person. Respirations are even and unlabored. Gait evaluation reveals a limp. There is no inguinal adenopathy. Examination of the contralateral hip shows normal range of motion, strength, no tenderness, and intact skin. The affected limb is well-perfused, shows a grossly normal motor and sensory examination. Examination of the hip shows no skin lesions. Hip motion is reduced and causes pain. FADIR is positive and RIGO is positive. Stinchfield test is positive. Leg lengths are approximately equal. Both hips are stable and muscle strength is normal. Right knee motion is significantly reduced and does cause significant pain. The knee moves from 0 to 125 degrees. The knee is stable within that range-of-motion to AP and ML stress. The alignment of the knee is 5 degrees varus. Muscle strength is normal. Pedal pulses are palpable.\par  [de-identified] : AP and lateral x-rays of the right hip, pelvis, and femur were ordered and taken in the office and demonstrate degenerative joint disease of the hip with joint space narrowing, osteophyte formation, and subchondral sclerosis.\par \par Long standing knee, AP knee, lateral knee, and patellar views of the right knee were ordered and taken in the office and demonstrate degenerative joint disease of the knee with joint space narrowing, osteophyte formation, and subchondral sclerosis.

## 2023-05-18 NOTE — DISCUSSION/SUMMARY
[de-identified] : This patient has right hip osteoarthritis and right knee osteoarthritis.  The patient is not an appropriate candidate for surgical intervention at this time. An extensive discussion was conducted on the natural history of the disease and the variety of surgical and non-surgical options available to the patient including, but not limited to non-steroidal anti-inflammatory medications, steroid injections, physical therapy, maintenance of ideal body weight, and reduction of activity.  Today we performed a right knee intra-articular cortisone injection.  Mobic prescribed.\par The patient will schedule an appointment as needed.\par \par Informed consent for the right knee injection was obtained. All questions were answered. A time out was performed. The right knee was prepped and draped in sterile fashion. Using sterile technique, the right knee was injected with 80mg of Kenalog, 4cc of 1% lidocaine, 4cc of 0.25% marcaine using a 21-gauge needle. A sterile dressing was applied. Post injection instructions were reviewed. The patient tolerated the procedure well.\par

## 2023-12-19 ENCOUNTER — RX RENEWAL (OUTPATIENT)
Age: 58
End: 2023-12-19

## 2023-12-19 RX ORDER — MELOXICAM 15 MG/1
15 TABLET ORAL
Qty: 30 | Refills: 0 | Status: ACTIVE | COMMUNITY
Start: 2023-05-18 | End: 1900-01-01

## 2024-01-31 ENCOUNTER — APPOINTMENT (OUTPATIENT)
Dept: ORTHOPEDIC SURGERY | Facility: CLINIC | Age: 59
End: 2024-01-31

## 2024-04-02 ENCOUNTER — APPOINTMENT (OUTPATIENT)
Dept: ORTHOPEDIC SURGERY | Facility: CLINIC | Age: 59
End: 2024-04-02
Payer: MEDICAID

## 2024-04-02 VITALS — HEIGHT: 72 IN | BODY MASS INDEX: 35.08 KG/M2 | WEIGHT: 259 LBS

## 2024-04-02 DIAGNOSIS — M17.11 UNILATERAL PRIMARY OSTEOARTHRITIS, RIGHT KNEE: ICD-10-CM

## 2024-04-02 PROCEDURE — 73564 X-RAY EXAM KNEE 4 OR MORE: CPT | Mod: RT

## 2024-04-02 PROCEDURE — 99214 OFFICE O/P EST MOD 30 MIN: CPT | Mod: 25

## 2024-04-02 PROCEDURE — 20610 DRAIN/INJ JOINT/BURSA W/O US: CPT | Mod: RT

## 2024-04-02 RX ORDER — MELOXICAM 15 MG/1
15 TABLET ORAL
Qty: 60 | Refills: 1 | Status: ACTIVE | COMMUNITY
Start: 2024-04-02 | End: 1900-01-01

## 2024-04-02 NOTE — PHYSICAL EXAM
[de-identified] : Patient is well nourished, well-developed, in no acute distress, with appropriate mood and affect. The patient is oriented to time, place, and person. Respirations are even and unlabored. Gait evaluation reveals a limp. There is no inguinal adenopathy. Examination of the contralateral hip shows normal range of motion, strength, no tenderness, and intact skin. The affected limb is well-perfused, shows a grossly normal motor and sensory examination.  Right knee motion is significantly reduced and does cause significant pain. The knee moves from 0 to 125 degrees. The knee is stable within that range-of-motion to AP and ML stress. The alignment of the knee is 5 degrees varus. Muscle strength is normal. Pedal pulses are palpable.  [de-identified] : Long standing knee, AP knee, lateral knee, and patellar views of the right knee were ordered and taken in the office and demonstrate degenerative joint disease of the knee with joint space narrowing, osteophyte formation, and subchondral sclerosis.

## 2024-04-02 NOTE — DISCUSSION/SUMMARY
[de-identified] : This patient has right hip osteoarthritis and right knee osteoarthritis.  The patient is not an appropriate candidate for surgical intervention at this time. An extensive discussion was conducted on the natural history of the disease and the variety of surgical and non-surgical options available to the patient including, but not limited to non-steroidal anti-inflammatory medications, steroid injections, physical therapy, maintenance of ideal body weight, and reduction of activity.  Today we performed a right knee intra-articular cortisone injection.  Mobic prescribed. The patient will schedule an appointment as needed.  Informed consent for the right knee injection was obtained. All risks, benefits and alternatives were discussed. These included but were not limited to bleeding, infection, and allergic reaction.  All questions were answered. The right knee was prepped and draped in sterile fashion. Using sterile technique, the right knee was injected with 80mg of Kenalog, 4cc of 1% lidocaine, 4cc of 0.25% marcaine using a 21-gauge needle. A sterile dressing was applied. Post injection instructions were reviewed. The patient tolerated the procedure well.

## 2024-04-02 NOTE — HISTORY OF PRESENT ILLNESS
[de-identified] : This is very nice 58-year-old gentleman experiencing right knee pain, which is severe in intensity. He has known right knee osteoarthritis. The pain substantially limits activities of daily living. Walking tolerance is reduced.  Intermittently takes naproxen which helps.Injections helped 1 year ago. Now pain returned and worse.  The patient denies any radiation of the pain to the feet and it is not associated with numbness, tingling, or weakness.

## 2024-07-30 ENCOUNTER — APPOINTMENT (OUTPATIENT)
Dept: ORTHOPEDIC SURGERY | Facility: CLINIC | Age: 59
End: 2024-07-30
Payer: MEDICAID

## 2024-07-30 VITALS — BODY MASS INDEX: 35.08 KG/M2 | WEIGHT: 259 LBS | HEIGHT: 72 IN

## 2024-07-30 DIAGNOSIS — M17.0 BILATERAL PRIMARY OSTEOARTHRITIS OF KNEE: ICD-10-CM

## 2024-07-30 PROCEDURE — 73564 X-RAY EXAM KNEE 4 OR MORE: CPT | Mod: LT,RT

## 2024-07-30 PROCEDURE — 99214 OFFICE O/P EST MOD 30 MIN: CPT | Mod: 25

## 2024-07-30 PROCEDURE — 20610 DRAIN/INJ JOINT/BURSA W/O US: CPT | Mod: 50

## 2024-07-30 RX ORDER — IBUPROFEN 400 MG/1
400 TABLET, FILM COATED ORAL 3 TIMES DAILY
Qty: 90 | Refills: 0 | Status: ACTIVE | COMMUNITY
Start: 2024-07-30 | End: 1900-01-01

## 2024-07-30 NOTE — PHYSICAL EXAM
[de-identified] : Patient is well nourished, well-developed, in no acute distress, with appropriate mood and affect. The patient is oriented to time, place, and person. Respirations are even and unlabored. Gait evaluation reveals a limp. There is no inguinal adenopathy. The bilateral limbs are well-perfused, shows a grossly normal motor and sensory examination.  Bilateral knee motion is significantly reduced and does cause significant pain. The knee moves from 0 to 125 degrees. The knee is stable within that range-of-motion to AP and ML stress. The alignment of the knee is 5 degrees varus. Muscle strength is normal. Pedal pulses are palpable.  [de-identified] : Long standing knee, AP knee, lateral knee, and patellar views of the bilateral knee were ordered and taken in the office and demonstrate degenerative joint disease of the knee with joint space narrowing, osteophyte formation, and subchondral sclerosis.

## 2024-07-30 NOTE — HISTORY OF PRESENT ILLNESS
[de-identified] : This is very nice 58-year-old gentleman experiencing right knee pain, which is severe in intensity. Now also has left knee pain. He has known right knee osteoarthritis. The pain substantially limits activities of daily living. Walking tolerance is reduced.  Intermittently takes ibuprofen which helps. Injections helped 1 year ago. Now pain returned and worse.  The patient denies any radiation of the pain to the feet and it is not associated with numbness, tingling, or weakness.

## 2024-07-30 NOTE — DISCUSSION/SUMMARY
[de-identified] : This patient has right hip osteoarthritis and bilateral knee osteoarthritis.  The patient is not an appropriate candidate for surgical intervention at this time. An extensive discussion was conducted on the natural history of the disease and the variety of surgical and non-surgical options available to the patient including, but not limited to non-steroidal anti-inflammatory medications, steroid injections, physical therapy, maintenance of ideal body weight, and reduction of activity.  Today we performed a bilateral knee intra-articular cortisone injection.  Ibuprofen prescribed. The patient will schedule an appointment as needed.  Informed consent for bilateral knee injections was obtained. All risks, benefits and alternatives were discussed. These included but were not limited to bleeding, infection, and allergic reaction.  All questions were answered. A time out was performed. The bilateral knees were prepped and draped in sterile fashion. Using sterile technique, the bilateral knees were each injected with 40mg of Kenalog, 4cc of 1% lidocaine, 4cc of 0.25% marcaine using a 21-gauge needle. A sterile dressing was applied. Post injection instructions were reviewed. The patient tolerated the procedure well.

## 2024-08-05 ENCOUNTER — NON-APPOINTMENT (OUTPATIENT)
Age: 59
End: 2024-08-05

## 2024-08-05 ENCOUNTER — APPOINTMENT (OUTPATIENT)
Dept: OTOLARYNGOLOGY | Facility: CLINIC | Age: 59
End: 2024-08-05

## 2024-08-05 PROBLEM — H90.3 ASYMMETRICAL SENSORINEURAL HEARING LOSS: Status: ACTIVE | Noted: 2024-08-05

## 2024-08-05 PROCEDURE — 92567 TYMPANOMETRY: CPT

## 2024-08-05 PROCEDURE — 99203 OFFICE O/P NEW LOW 30 MIN: CPT | Mod: 25

## 2024-08-05 PROCEDURE — 92557 COMPREHENSIVE HEARING TEST: CPT

## 2024-08-05 NOTE — ASSESSMENT
[FreeTextEntry1] : Stable Asymm SNHL Rec HAE vs BAHA Pt referred back to Dr Lu for further eval and tx

## 2024-08-05 NOTE — DATA REVIEWED
[de-identified] : Hearing Test performed to evaluate the extent of hearing loss and  to explain pt's symptoms today's hearing test was personally reviewed and revealed Tymps-wnl Hearing-left profound HL

## 2024-12-03 ENCOUNTER — APPOINTMENT (OUTPATIENT)
Dept: ORTHOPEDIC SURGERY | Facility: CLINIC | Age: 59
End: 2024-12-03
Payer: MEDICAID

## 2024-12-03 VITALS — BODY MASS INDEX: 29.53 KG/M2 | HEIGHT: 72 IN | WEIGHT: 218 LBS

## 2024-12-03 DIAGNOSIS — M17.0 BILATERAL PRIMARY OSTEOARTHRITIS OF KNEE: ICD-10-CM

## 2024-12-03 PROCEDURE — 20610 DRAIN/INJ JOINT/BURSA W/O US: CPT | Mod: 50

## 2024-12-03 PROCEDURE — 99214 OFFICE O/P EST MOD 30 MIN: CPT | Mod: 25

## 2025-01-02 ENCOUNTER — NON-APPOINTMENT (OUTPATIENT)
Age: 60
End: 2025-01-02

## 2025-04-29 ENCOUNTER — APPOINTMENT (OUTPATIENT)
Dept: ORTHOPEDIC SURGERY | Facility: CLINIC | Age: 60
End: 2025-04-29
Payer: MEDICAID

## 2025-04-29 VITALS — BODY MASS INDEX: 29.53 KG/M2 | WEIGHT: 218 LBS | HEIGHT: 72 IN

## 2025-04-29 DIAGNOSIS — M17.0 BILATERAL PRIMARY OSTEOARTHRITIS OF KNEE: ICD-10-CM

## 2025-04-29 PROCEDURE — 99214 OFFICE O/P EST MOD 30 MIN: CPT | Mod: 25

## 2025-04-29 PROCEDURE — 20610 DRAIN/INJ JOINT/BURSA W/O US: CPT | Mod: 50

## 2025-04-29 PROCEDURE — 73564 X-RAY EXAM KNEE 4 OR MORE: CPT | Mod: LT,RT

## 2025-04-29 RX ORDER — CELECOXIB 200 MG/1
200 CAPSULE ORAL
Qty: 30 | Refills: 2 | Status: ACTIVE | COMMUNITY
Start: 2025-04-29 | End: 1900-01-01

## 2025-07-14 ENCOUNTER — RX RENEWAL (OUTPATIENT)
Age: 60
End: 2025-07-14

## 2025-07-29 ENCOUNTER — APPOINTMENT (OUTPATIENT)
Dept: ORTHOPEDIC SURGERY | Facility: CLINIC | Age: 60
End: 2025-07-29
Payer: MEDICAID

## 2025-07-29 VITALS — WEIGHT: 218 LBS | BODY MASS INDEX: 29.53 KG/M2 | HEIGHT: 72 IN

## 2025-07-29 DIAGNOSIS — M17.0 BILATERAL PRIMARY OSTEOARTHRITIS OF KNEE: ICD-10-CM

## 2025-07-29 PROCEDURE — 99214 OFFICE O/P EST MOD 30 MIN: CPT | Mod: 25

## 2025-07-29 PROCEDURE — 20610 DRAIN/INJ JOINT/BURSA W/O US: CPT | Mod: 50
